# Patient Record
Sex: MALE | Race: WHITE | NOT HISPANIC OR LATINO
[De-identification: names, ages, dates, MRNs, and addresses within clinical notes are randomized per-mention and may not be internally consistent; named-entity substitution may affect disease eponyms.]

---

## 2017-03-04 ENCOUNTER — APPOINTMENT (OUTPATIENT)
Dept: ULTRASOUND IMAGING | Facility: CLINIC | Age: 82
End: 2017-03-04

## 2017-03-04 ENCOUNTER — OUTPATIENT (OUTPATIENT)
Dept: OUTPATIENT SERVICES | Facility: HOSPITAL | Age: 82
LOS: 1 days | End: 2017-03-04
Payer: MEDICARE

## 2017-03-04 DIAGNOSIS — Z00.8 ENCOUNTER FOR OTHER GENERAL EXAMINATION: ICD-10-CM

## 2017-03-04 PROCEDURE — 76770 US EXAM ABDO BACK WALL COMP: CPT

## 2017-03-09 ENCOUNTER — APPOINTMENT (OUTPATIENT)
Dept: UROLOGY | Facility: CLINIC | Age: 82
End: 2017-03-09

## 2017-03-09 VITALS
DIASTOLIC BLOOD PRESSURE: 70 MMHG | WEIGHT: 180 LBS | TEMPERATURE: 98.2 F | HEART RATE: 74 BPM | SYSTOLIC BLOOD PRESSURE: 120 MMHG | OXYGEN SATURATION: 97 % | HEIGHT: 67.5 IN | BODY MASS INDEX: 27.92 KG/M2 | RESPIRATION RATE: 16 BRPM

## 2017-03-13 LAB — BACTERIA UR CULT: NORMAL

## 2017-06-12 LAB — BACTERIA UR CULT: NORMAL

## 2017-07-06 ENCOUNTER — APPOINTMENT (OUTPATIENT)
Dept: UROLOGY | Facility: CLINIC | Age: 82
End: 2017-07-06

## 2017-07-06 VITALS
BODY MASS INDEX: 28.25 KG/M2 | SYSTOLIC BLOOD PRESSURE: 130 MMHG | DIASTOLIC BLOOD PRESSURE: 70 MMHG | WEIGHT: 180 LBS | HEIGHT: 67 IN | OXYGEN SATURATION: 97 % | HEART RATE: 79 BPM

## 2017-07-10 LAB — CORE LAB FLUID CYTOLOGY: NORMAL

## 2017-07-13 ENCOUNTER — MEDICATION RENEWAL (OUTPATIENT)
Age: 82
End: 2017-07-13

## 2017-07-31 ENCOUNTER — APPOINTMENT (OUTPATIENT)
Dept: UROLOGY | Facility: CLINIC | Age: 82
End: 2017-07-31
Payer: MEDICARE

## 2017-07-31 PROCEDURE — 99213 OFFICE O/P EST LOW 20 MIN: CPT

## 2017-09-14 ENCOUNTER — APPOINTMENT (OUTPATIENT)
Dept: UROLOGY | Facility: CLINIC | Age: 82
End: 2017-09-14
Payer: MEDICARE

## 2017-09-14 VITALS — SYSTOLIC BLOOD PRESSURE: 120 MMHG | DIASTOLIC BLOOD PRESSURE: 60 MMHG

## 2017-09-14 PROCEDURE — 99213 OFFICE O/P EST LOW 20 MIN: CPT

## 2017-09-14 RX ORDER — DUTASTERIDE 0.5 MG/1
0.5 CAPSULE, LIQUID FILLED ORAL
Qty: 30 | Refills: 6 | Status: DISCONTINUED | COMMUNITY
Start: 2017-07-06 | End: 2017-09-14

## 2017-09-17 LAB
APPEARANCE: ABNORMAL
BACTERIA UR CULT: NORMAL
BACTERIA: NEGATIVE
BILIRUBIN URINE: NEGATIVE
BLOOD URINE: ABNORMAL
COLOR: ABNORMAL
GLUCOSE QUALITATIVE U: NORMAL MG/DL
HYALINE CASTS: 1 /LPF
KETONES URINE: NEGATIVE
LEUKOCYTE ESTERASE URINE: ABNORMAL
MICROSCOPIC-UA: NORMAL
NITRITE URINE: NEGATIVE
PH URINE: 5
PROTEIN URINE: 100 MG/DL
RED BLOOD CELLS URINE: 276 /HPF
SPECIFIC GRAVITY URINE: 1.02
SQUAMOUS EPITHELIAL CELLS: 0 /HPF
UROBILINOGEN URINE: NORMAL MG/DL
WHITE BLOOD CELLS URINE: 6 /HPF

## 2018-01-11 ENCOUNTER — APPOINTMENT (OUTPATIENT)
Dept: UROLOGY | Facility: CLINIC | Age: 83
End: 2018-01-11
Payer: MEDICARE

## 2018-01-11 DIAGNOSIS — Z87.448 PERSONAL HISTORY OF OTHER DISEASES OF URINARY SYSTEM: ICD-10-CM

## 2018-01-11 PROCEDURE — 99213 OFFICE O/P EST LOW 20 MIN: CPT

## 2018-02-12 ENCOUNTER — MEDICATION RENEWAL (OUTPATIENT)
Age: 83
End: 2018-02-12

## 2018-07-31 ENCOUNTER — RX RENEWAL (OUTPATIENT)
Age: 83
End: 2018-07-31

## 2018-08-09 ENCOUNTER — APPOINTMENT (OUTPATIENT)
Dept: UROLOGY | Facility: CLINIC | Age: 83
End: 2018-08-09
Payer: MEDICARE

## 2018-08-09 VITALS — HEART RATE: 69 BPM | SYSTOLIC BLOOD PRESSURE: 120 MMHG | OXYGEN SATURATION: 96 % | DIASTOLIC BLOOD PRESSURE: 70 MMHG

## 2018-08-09 PROCEDURE — 99213 OFFICE O/P EST LOW 20 MIN: CPT

## 2018-11-12 ENCOUNTER — OUTPATIENT (OUTPATIENT)
Dept: OUTPATIENT SERVICES | Facility: HOSPITAL | Age: 83
LOS: 1 days | End: 2018-11-12
Payer: MEDICARE

## 2018-11-12 ENCOUNTER — APPOINTMENT (OUTPATIENT)
Dept: ULTRASOUND IMAGING | Facility: CLINIC | Age: 83
End: 2018-11-12
Payer: MEDICARE

## 2018-11-12 DIAGNOSIS — Z00.8 ENCOUNTER FOR OTHER GENERAL EXAMINATION: ICD-10-CM

## 2018-11-12 PROCEDURE — 76536 US EXAM OF HEAD AND NECK: CPT

## 2018-11-12 PROCEDURE — 76536 US EXAM OF HEAD AND NECK: CPT | Mod: 26

## 2018-11-14 ENCOUNTER — RESULT REVIEW (OUTPATIENT)
Age: 83
End: 2018-11-14

## 2019-01-28 ENCOUNTER — MEDICATION RENEWAL (OUTPATIENT)
Age: 84
End: 2019-01-28

## 2019-06-28 ENCOUNTER — APPOINTMENT (OUTPATIENT)
Dept: UROLOGY | Facility: CLINIC | Age: 84
End: 2019-06-28
Payer: MEDICARE

## 2019-06-28 VITALS
WEIGHT: 172 LBS | HEART RATE: 80 BPM | HEIGHT: 67 IN | BODY MASS INDEX: 27 KG/M2 | SYSTOLIC BLOOD PRESSURE: 128 MMHG | DIASTOLIC BLOOD PRESSURE: 73 MMHG | RESPIRATION RATE: 16 BRPM

## 2019-06-28 PROCEDURE — 99213 OFFICE O/P EST LOW 20 MIN: CPT

## 2019-06-28 NOTE — ASSESSMENT
[FreeTextEntry1] : He seems to be voiding relatively well with minimal nocturia. Residual urine volume is acceptable. He will continue with the same medication therapy. He can follow up in one year. He will continue to watch the kidney stone.\par \par Thang Gabriel MD, FACS\par [facility]\par  of Urology\par \par [address], [address]\[place] [address]\par \par [address], [address]\[place] [address]\par \par Tel: [phone]\par Fax: [phone]

## 2019-06-28 NOTE — LETTER BODY
[Dear  ___] : Dear  [unfilled], [Consult Letter:] : I had the pleasure of evaluating your patient, [unfilled]. [Consult Closing:] : Thank you very much for allowing me to participate in the care of this patient.  If you have any questions, please do not hesitate to contact me. [FreeTextEntry1] : Address:  [address], [place] [address]\par Phone: [phone]

## 2019-06-28 NOTE — HISTORY OF PRESENT ILLNESS
[FreeTextEntry1] : He is an 88-year-old man who is seen today in follow-up for urinary symptoms, kidney stones and hematuria. He has to double void to empty his bladder. Residual urine today was 120 cc. He is on finasteride and tamsulosin. There has not been any recent hematuria. There is no flank pain. He is decided not to treat kidney stone for now. \par Previous notes: previously, cystoscopy showed bleeding from prostate and also small sand-like stones in the bladder. CT showed a 1.7 cm left kidney stone a 3 cm AAA. Urine culture and cytology were negative. He underwent shockwave lithotripsy about 30 years ago for kidney stone.

## 2019-06-28 NOTE — PHYSICAL EXAM
[General Appearance - Well Developed] : well developed [General Appearance - Well Nourished] : well nourished [Normal Appearance] : normal appearance [Well Groomed] : well groomed [General Appearance - In No Acute Distress] : no acute distress [Abdomen Soft] : soft [Abdomen Tenderness] : non-tender [Costovertebral Angle Tenderness] : no ~M costovertebral angle tenderness [FreeTextEntry1] : The bladder not palpable [] : no respiratory distress [Respiration, Rhythm And Depth] : normal respiratory rhythm and effort [Exaggerated Use Of Accessory Muscles For Inspiration] : no accessory muscle use [Oriented To Time, Place, And Person] : oriented to person, place, and time [Affect] : the affect was normal [Mood] : the mood was normal [Not Anxious] : not anxious

## 2019-08-08 ENCOUNTER — APPOINTMENT (OUTPATIENT)
Dept: UROLOGY | Facility: CLINIC | Age: 84
End: 2019-08-08

## 2020-08-14 ENCOUNTER — APPOINTMENT (OUTPATIENT)
Dept: UROLOGY | Facility: CLINIC | Age: 85
End: 2020-08-14
Payer: MEDICARE

## 2020-08-14 VITALS
TEMPERATURE: 98.7 F | HEART RATE: 79 BPM | DIASTOLIC BLOOD PRESSURE: 76 MMHG | WEIGHT: 174 LBS | RESPIRATION RATE: 16 BRPM | HEIGHT: 67 IN | BODY MASS INDEX: 27.31 KG/M2 | SYSTOLIC BLOOD PRESSURE: 154 MMHG

## 2020-08-14 PROCEDURE — 99213 OFFICE O/P EST LOW 20 MIN: CPT

## 2020-08-14 NOTE — HISTORY OF PRESENT ILLNESS
[FreeTextEntry1] : He is an 89 year-old man who is seen today in follow-up for urinary symptoms, kidney stones and hematuria. Nocturia is only one time. Urinary flow is normal. Residual urine today was 80 cc. He is on Flomax and Proscar. He does not have any kidney pain or recent gross hematuria. So far he has continued with observation of large kidney stone.\par Previous notes: previously, cystoscopy showed bleeding from prostate and also small sand-like stones in the bladder. CT showed a 1.7 cm left kidney stone and 3 cm AAA. Urine culture and cytology were negative. He underwent shockwave lithotripsy about 30 years ago for kidney stone.

## 2020-08-14 NOTE — LETTER BODY
[Dear  ___] : Dear  [unfilled], [Consult Letter:] : I had the pleasure of evaluating your patient, [unfilled]. [Consult Closing:] : Thank you very much for allowing me to participate in the care of this patient.  If you have any questions, please do not hesitate to contact me. [FreeTextEntry1] : Address:  [address], [place] [address]\par Phone: [phone]

## 2020-08-14 NOTE — ASSESSMENT
[FreeTextEntry1] : His examination is unremarkable. He is doing very well from urinary standpoint. He will continue with the above medication therapy. Residual urine is acceptable. He is not bothered by large kidney stone and has not had recent hematuria. Followup in one year. Another copy of CT scan from 2017 was given to him for his primary care physician.\par \par Thang Gabriel MD, FACS\par [facility]\par  of Urology\par \par [address], [address]\[place] [address]\par \par [address], [address]\[place] [address]\par \par Tel: [phone]\par Fax: [phone]

## 2020-08-14 NOTE — PHYSICAL EXAM
[General Appearance - Well Developed] : well developed [General Appearance - Well Nourished] : well nourished [Normal Appearance] : normal appearance [Well Groomed] : well groomed [General Appearance - In No Acute Distress] : no acute distress [Abdomen Tenderness] : non-tender [Abdomen Soft] : soft [FreeTextEntry1] : The bladder not distended.  [Costovertebral Angle Tenderness] : no ~M costovertebral angle tenderness [] : no respiratory distress [Respiration, Rhythm And Depth] : normal respiratory rhythm and effort [Exaggerated Use Of Accessory Muscles For Inspiration] : no accessory muscle use [Oriented To Time, Place, And Person] : oriented to person, place, and time [Affect] : the affect was normal [Not Anxious] : not anxious [Mood] : the mood was normal

## 2020-08-21 ENCOUNTER — INPATIENT (INPATIENT)
Facility: HOSPITAL | Age: 85
LOS: 4 days | Discharge: HOME | DRG: 280 | End: 2020-08-26
Attending: INTERNAL MEDICINE | Admitting: INTERNAL MEDICINE
Payer: MEDICARE

## 2020-08-21 VITALS
RESPIRATION RATE: 28 BRPM | HEART RATE: 106 BPM | OXYGEN SATURATION: 98 % | DIASTOLIC BLOOD PRESSURE: 61 MMHG | SYSTOLIC BLOOD PRESSURE: 143 MMHG

## 2020-08-21 DIAGNOSIS — I21.3 ST ELEVATION (STEMI) MYOCARDIAL INFARCTION OF UNSPECIFIED SITE: ICD-10-CM

## 2020-08-21 LAB
ALBUMIN SERPL ELPH-MCNC: 4 G/DL — SIGNIFICANT CHANGE UP (ref 3.3–5.2)
ALP SERPL-CCNC: 86 U/L — SIGNIFICANT CHANGE UP (ref 40–120)
ALT FLD-CCNC: 32 U/L — SIGNIFICANT CHANGE UP
ANION GAP SERPL CALC-SCNC: 19 MMOL/L — HIGH (ref 5–17)
APTT BLD: 29.3 SEC — SIGNIFICANT CHANGE UP (ref 27.5–35.5)
AST SERPL-CCNC: 76 U/L — HIGH
BASOPHILS # BLD AUTO: 0 K/UL — SIGNIFICANT CHANGE UP (ref 0–0.2)
BASOPHILS NFR BLD AUTO: 0 % — SIGNIFICANT CHANGE UP (ref 0–2)
BILIRUB SERPL-MCNC: 0.9 MG/DL — SIGNIFICANT CHANGE UP (ref 0.4–2)
BLD GP AB SCN SERPL QL: SIGNIFICANT CHANGE UP
BUN SERPL-MCNC: 12 MG/DL — SIGNIFICANT CHANGE UP (ref 8–20)
CALCIUM SERPL-MCNC: 8.5 MG/DL — LOW (ref 8.6–10.2)
CHLORIDE SERPL-SCNC: 96 MMOL/L — LOW (ref 98–107)
CO2 SERPL-SCNC: 19 MMOL/L — LOW (ref 22–29)
CREAT SERPL-MCNC: 0.92 MG/DL — SIGNIFICANT CHANGE UP (ref 0.5–1.3)
EOSINOPHIL # BLD AUTO: 0 K/UL — SIGNIFICANT CHANGE UP (ref 0–0.5)
EOSINOPHIL NFR BLD AUTO: 0 % — SIGNIFICANT CHANGE UP (ref 0–6)
GAS PNL BLDA: SIGNIFICANT CHANGE UP
GLUCOSE SERPL-MCNC: 253 MG/DL — HIGH (ref 70–99)
HCT VFR BLD CALC: 43.8 % — SIGNIFICANT CHANGE UP (ref 39–50)
HGB BLD-MCNC: 14.2 G/DL — SIGNIFICANT CHANGE UP (ref 13–17)
INR BLD: 1.07 RATIO — SIGNIFICANT CHANGE UP (ref 0.88–1.16)
LYMPHOCYTES # BLD AUTO: 1.47 K/UL — SIGNIFICANT CHANGE UP (ref 1–3.3)
LYMPHOCYTES # BLD AUTO: 5.9 % — LOW (ref 13–44)
MAGNESIUM SERPL-MCNC: 1.7 MG/DL — SIGNIFICANT CHANGE UP (ref 1.6–2.6)
MANUAL SMEAR VERIFICATION: SIGNIFICANT CHANGE UP
MCHC RBC-ENTMCNC: 31.8 PG — SIGNIFICANT CHANGE UP (ref 27–34)
MCHC RBC-ENTMCNC: 32.4 GM/DL — SIGNIFICANT CHANGE UP (ref 32–36)
MCV RBC AUTO: 98 FL — SIGNIFICANT CHANGE UP (ref 80–100)
MONOCYTES # BLD AUTO: 1.47 K/UL — HIGH (ref 0–0.9)
MONOCYTES NFR BLD AUTO: 5.9 % — SIGNIFICANT CHANGE UP (ref 2–14)
NEUTROPHILS # BLD AUTO: 21.93 K/UL — HIGH (ref 1.8–7.4)
NEUTROPHILS NFR BLD AUTO: 87.3 % — HIGH (ref 43–77)
NEUTS BAND # BLD: 0.9 % — SIGNIFICANT CHANGE UP (ref 0–8)
NT-PROBNP SERPL-SCNC: 2525 PG/ML — HIGH (ref 0–300)
PHOSPHATE SERPL-MCNC: 3.5 MG/DL — SIGNIFICANT CHANGE UP (ref 2.4–4.7)
PLAT MORPH BLD: NORMAL — SIGNIFICANT CHANGE UP
PLATELET # BLD AUTO: 224 K/UL — SIGNIFICANT CHANGE UP (ref 150–400)
POTASSIUM SERPL-MCNC: 4.1 MMOL/L — SIGNIFICANT CHANGE UP (ref 3.5–5.3)
POTASSIUM SERPL-SCNC: 4.1 MMOL/L — SIGNIFICANT CHANGE UP (ref 3.5–5.3)
PROT SERPL-MCNC: 7.1 G/DL — SIGNIFICANT CHANGE UP (ref 6.6–8.7)
PROTHROM AB SERPL-ACNC: 12.4 SEC — SIGNIFICANT CHANGE UP (ref 10.6–13.6)
RBC # BLD: 4.47 M/UL — SIGNIFICANT CHANGE UP (ref 4.2–5.8)
RBC # FLD: 13.2 % — SIGNIFICANT CHANGE UP (ref 10.3–14.5)
RBC BLD AUTO: NORMAL — SIGNIFICANT CHANGE UP
SODIUM SERPL-SCNC: 134 MMOL/L — LOW (ref 135–145)
TROPONIN T SERPL-MCNC: 0.82 NG/ML — HIGH (ref 0–0.06)
WBC # BLD: 24.86 K/UL — HIGH (ref 3.8–10.5)
WBC # FLD AUTO: 24.86 K/UL — HIGH (ref 3.8–10.5)

## 2020-08-21 PROCEDURE — 93458 L HRT ARTERY/VENTRICLE ANGIO: CPT | Mod: 26

## 2020-08-21 PROCEDURE — 93010 ELECTROCARDIOGRAM REPORT: CPT

## 2020-08-21 PROCEDURE — 99223 1ST HOSP IP/OBS HIGH 75: CPT | Mod: 25

## 2020-08-21 PROCEDURE — 71045 X-RAY EXAM CHEST 1 VIEW: CPT | Mod: 26

## 2020-08-21 RX ORDER — CLOPIDOGREL BISULFATE 75 MG/1
75 TABLET, FILM COATED ORAL DAILY
Refills: 0 | Status: DISCONTINUED | OUTPATIENT
Start: 2020-08-22 | End: 2020-08-26

## 2020-08-21 RX ORDER — FINASTERIDE 5 MG/1
1 TABLET, FILM COATED ORAL
Qty: 0 | Refills: 0 | DISCHARGE

## 2020-08-21 RX ORDER — PROPOFOL 10 MG/ML
10 INJECTION, EMULSION INTRAVENOUS
Qty: 1000 | Refills: 0 | Status: DISCONTINUED | OUTPATIENT
Start: 2020-08-21 | End: 2020-08-21

## 2020-08-21 RX ORDER — HEPARIN SODIUM 5000 [USP'U]/ML
4000 INJECTION INTRAVENOUS; SUBCUTANEOUS EVERY 6 HOURS
Refills: 0 | Status: DISCONTINUED | OUTPATIENT
Start: 2020-08-21 | End: 2020-08-24

## 2020-08-21 RX ORDER — CHLORHEXIDINE GLUCONATE 213 G/1000ML
1 SOLUTION TOPICAL
Refills: 0 | Status: DISCONTINUED | OUTPATIENT
Start: 2020-08-21 | End: 2020-08-26

## 2020-08-21 RX ORDER — AMIODARONE HYDROCHLORIDE 400 MG/1
0.5 TABLET ORAL
Qty: 900 | Refills: 0 | Status: DISCONTINUED | OUTPATIENT
Start: 2020-08-21 | End: 2020-08-22

## 2020-08-21 RX ORDER — HEPARIN SODIUM 5000 [USP'U]/ML
INJECTION INTRAVENOUS; SUBCUTANEOUS
Qty: 25000 | Refills: 0 | Status: DISCONTINUED | OUTPATIENT
Start: 2020-08-21 | End: 2020-08-24

## 2020-08-21 RX ORDER — ASPIRIN/CALCIUM CARB/MAGNESIUM 324 MG
81 TABLET ORAL DAILY
Refills: 0 | Status: DISCONTINUED | OUTPATIENT
Start: 2020-08-21 | End: 2020-08-26

## 2020-08-21 RX ORDER — DEXMEDETOMIDINE HYDROCHLORIDE IN 0.9% SODIUM CHLORIDE 4 UG/ML
0.5 INJECTION INTRAVENOUS
Qty: 200 | Refills: 0 | Status: DISCONTINUED | OUTPATIENT
Start: 2020-08-21 | End: 2020-08-22

## 2020-08-21 RX ORDER — ASPIRIN/CALCIUM CARB/MAGNESIUM 324 MG
1 TABLET ORAL
Qty: 0 | Refills: 0 | DISCHARGE

## 2020-08-21 RX ORDER — CLOPIDOGREL BISULFATE 75 MG/1
600 TABLET, FILM COATED ORAL ONCE
Refills: 0 | Status: COMPLETED | OUTPATIENT
Start: 2020-08-21 | End: 2020-08-21

## 2020-08-21 RX ORDER — CHLORHEXIDINE GLUCONATE 213 G/1000ML
15 SOLUTION TOPICAL EVERY 12 HOURS
Refills: 0 | Status: DISCONTINUED | OUTPATIENT
Start: 2020-08-21 | End: 2020-08-21

## 2020-08-21 RX ORDER — OMEPRAZOLE 10 MG/1
1 CAPSULE, DELAYED RELEASE ORAL
Qty: 0 | Refills: 0 | DISCHARGE

## 2020-08-21 RX ORDER — PANTOPRAZOLE SODIUM 20 MG/1
40 TABLET, DELAYED RELEASE ORAL DAILY
Refills: 0 | Status: DISCONTINUED | OUTPATIENT
Start: 2020-08-21 | End: 2020-08-22

## 2020-08-21 RX ORDER — ATORVASTATIN CALCIUM 80 MG/1
80 TABLET, FILM COATED ORAL AT BEDTIME
Refills: 0 | Status: DISCONTINUED | OUTPATIENT
Start: 2020-08-21 | End: 2020-08-26

## 2020-08-21 RX ADMIN — PROPOFOL 5.39 MICROGRAM(S)/KG/MIN: 10 INJECTION, EMULSION INTRAVENOUS at 21:06

## 2020-08-21 RX ADMIN — Medication 81 MILLIGRAM(S): at 22:29

## 2020-08-21 RX ADMIN — ATORVASTATIN CALCIUM 80 MILLIGRAM(S): 80 TABLET, FILM COATED ORAL at 22:29

## 2020-08-21 RX ADMIN — CLOPIDOGREL BISULFATE 600 MILLIGRAM(S): 75 TABLET, FILM COATED ORAL at 22:29

## 2020-08-21 RX ADMIN — PANTOPRAZOLE SODIUM 40 MILLIGRAM(S): 20 TABLET, DELAYED RELEASE ORAL at 22:32

## 2020-08-21 RX ADMIN — AMIODARONE HYDROCHLORIDE 16.7 MG/MIN: 400 TABLET ORAL at 22:50

## 2020-08-21 RX ADMIN — HEPARIN SODIUM 1000 UNIT(S)/HR: 5000 INJECTION INTRAVENOUS; SUBCUTANEOUS at 20:52

## 2020-08-21 NOTE — H&P ADULT - NSICDXPASTMEDICALHX_GEN_ALL_CORE_FT
PAST MEDICAL HISTORY:  BPH (benign prostatic hyperplasia)     HLD (hyperlipidemia)     HTN (hypertension)     Nephrolithiasis

## 2020-08-21 NOTE — H&P ADULT - HISTORY OF PRESENT ILLNESS
Mr Plascencia received from [facility] via [facility] as a STEMI. Patient receiced intubated and unresponsive.  No family members were present.    As per outpatient urology note patient has a PMHx of HTN, Nephrolithiasis, BPH and Hematuria.     As per chart from [facility] ED dept., patient presented with x/o CP and was being admitted for NSTEMI. A heparin drip was initiated.  Before being transferred out of the ED patient went into Vfib requiring resuscitation and intubation.  Repeat EKG revealed changes in V1 through V4. STEMI called and patient was transferred to [facility]. A Cardiac Catheterization was done and patient was noted to have an occluded distal LAD, thought to be the culprit vessel.  No intervention was done at this time.
Neck pain

## 2020-08-21 NOTE — CONSULT NOTE ADULT - SUBJECTIVE AND OBJECTIVE BOX
89 yo m pmhx HTN, HLD, BPH, nephrolithiasis presented to [facility] with chest pain, was being admitted for heparin gtt and ACS work up when he sustained vfib cardiac arrest prompting transfer to [facility].  Patient had witnessed arrest, vfib received ACLS, shocked, received amio then ROSC achieved.  Patient transferred to [facility] for emergent intervention, in cath lab patient found to have severe mid to distal calcified LAD disease with distal thrombus,   60-70% prox circ stenosis and 80-90% RCA stenosis, likely LAD infarct, EF 30-35%, no intervention at this time and was transferred to MICU.        PAST MEDICAL & SURGICAL HISTORY:  HTN    Allergies  Allergy Status Unknown      FAMILY HISTORY:  Unknown       Social History:   From home lives with son.       Review of Systems:  Unable to obtain 2/2       Vitals During Exam:   HR:   BP:   RR:  sPO2:     Physical Examination:    General: No acute distress.      HEENT: Pupils equal, reactive to light.  Symmetric.    PULM: Clear to auscultation bilaterally, no significant sputum production    CVS: Regular rate and rhythm, no murmurs, rubs, or gallops    ABD: Soft, nondistended, nontender, normoactive bowel sounds, no masses    EXT: No edema, nontender    SKIN: Warm and well perfused, no rashes noted.    NEURO: Alert, oriented, interactive, nonfocal      Medications:  chlorhexidine 4% Liquid 1 Application(s) Topical <User Schedule>      ICU Vital Signs Last 24 Hrs  T(C): --  T(F): --  HR: --  BP: --  BP(mean): --  ABP: --  ABP(mean): --  RR: --  SpO2: --    Vital Signs Last 24 Hrs  T(C): --  T(F): --  HR: --  BP: --  BP(mean): --  RR: --  SpO2: --        LABS:  PENDING      RADIOLOGY:   Pending      SUPPLEMENTAL O2: AC/VC  LINES: Peripheral   IVF: N  BECKFORD: N  PPx: PPI,   CONTACT: N 88 yo m pmhx HTN, HLD, BPH, nephrolithiasis presented to [facility] with chest pain, was being admitted for heparin gtt and ACS work up when he sustained vfib cardiac arrest prompting transfer to [facility].  Patient had witnessed arrest, vfib received ACLS, shocked, received amio then ROSC achieved.  Patient transferred to [facility] for emergent intervention, in cath lab patient found to have severe mid to distal calcified LAD disease with distal thrombus,   60-70% prox circ stenosis and 80-90% RCA stenosis, likely LAD infarct, EF 30-35%, no intervention at this time and was transferred to MICU.        PAST MEDICAL & SURGICAL HISTORY:  HTN    Allergies  Allergy Status Unknown      FAMILY HISTORY:  Unknown       Social History:   From home lives with son.       Review of Systems:  Unable to obtain 2/2       Physical Examination:    General: Elderly male, lying in bed, NAD    HEENT: NC/AT, Pupils 3mm, equal, reactive to light.  Symmetric. ETT and OG tube in place.     PULM: Symmetrical thorax expansion upon respiration.  Clear to auscultation bilaterally, no significant sputum production appreciated    CVS: Regular rate and rhythm, no murmurs, rubs, or gallops appreciated    ABD: Soft, nondistended, nontender, normoactive bowel sounds, no masses appreciated    EXT: No edema, nontender    SKIN: Warm and well perfused, no rashes noted.    NEURO: Intubated, moving around, nonpurposeful movement.       Medications:  chlorhexidine 4% Liquid 1 Application(s) Topical <User Schedule>      ICU Vital Signs Last 24 Hrs  T(C): --  T(F): --  HR: --  BP: --  BP(mean): --  ABP: --  ABP(mean): --  RR: --  SpO2: --    Vital Signs Last 24 Hrs  T(C): --  T(F): --  HR: --  BP: --  BP(mean): --  RR: --  SpO2: --        LABS:  PENDING      RADIOLOGY:   Pending      SUPPLEMENTAL O2: AC/VC  LINES: Peripheral   IVF: N  BECKFORD: N  PPx: PPI,   CONTACT: N

## 2020-08-21 NOTE — PROGRESS NOTE ADULT - SUBJECTIVE AND OBJECTIVE BOX
NSTEMI at [facility].     Full report to follow.     LM Distal 20-30%.   LAD- mid 70-80%, distal 100%.   circumflex- proximal 70%,   RCA- proximal 80%/     LV function- severely depressed.     Based on anatomy and other factors, plan for medical management.     Aspirin, plavix, statin. Hold beta blockers tonight.     based on neurologic recovery, consider PCI to circumflex and RCA on monday.     Called home phone number- no answer.

## 2020-08-21 NOTE — H&P ADULT - ASSESSMENT
89 yo male sp Vfib arrest, resuscitation, intubation and cardiac catheterization. Total occlusion of the distal LAD.    Plan:  Admit to MICU  Medical management  ASA 81 mg po daily  Plavix 600 mg po now   Plavix 75mg po daily  Lipitor 80 mg po daily  Troponin stat and Q 8 hours x 2  EKGs stat and Q 8 x 2  No beta blocker yet will reevaluate in am  Start Heparin gtt at ACS at 2100  ECHO in am

## 2020-08-21 NOTE — CONSULT NOTE ADULT - ASSESSMENT
88 yo m pmhx HTN, HLD, BPH, nephrolithiasis admitted with     1. Cardiac Arrest  2. STEMI  3. Respiratory Failure    NEURO: ?anoxic encephalopathy, patient moving around however no purposeful movement at this time, low dose propofol for sedation.    CV: STEMI with high grade LAD, circ and rca disease.  No intervention at this time pending improvement in mental status.  ASA, Brilinta, statin therapy.  Avoiding BB and afterload reduction meds at this time.    RESP: Hypoxic respiratory Failure ac/vc 6cc/kg tv lung protective strategies, actively titrating fio2 and peep for spo2 >88%, weaning as tolerated.  PPI for stress ulcer ppx, peridex for VAP ppx  RENAL:   GI: NPO   ENDO: POCT q6hr while nPO  ID: No active infectious process  HEME:   DISPO: Full code    Critical Care time: 45 mins assessing presenting problems of acute illness that poses high probability of life threatening deterioration or end organ damage/dysfunction.  Medical decision making including Initiating plan of care, reviewing data, reviewing radiology, discussing with multidisciplinary team, non inclusive of procedures, discussing goals of care with patient/family 89 yo m pmhx HTN, HLD, BPH, nephrolithiasis admitted with     1. Cardiac Arrest  2. STEMI  3. Respiratory Failure    NEURO: ?anoxic encephalopathy, patient moving around however no purposeful movement at this time, low dose propofol for sedation.    CV: STEMI with high grade LAD, circ and rca disease.  No intervention at this time pending improvement in mental status.  ASA, Brilinta, statin therapy.  Avoiding BB and afterload reduction meds at this time.    RESP: Hypoxic respiratory Failure ac/vc 6cc/kg tv lung protective strategies, actively titrating fio2 and peep for spo2 >88%, weaning as tolerated.  PPI for stress ulcer ppx, peridex for VAP ppx  RENAL: Monitor lytes, replace as needed, collazo in place.   GI: NPO   ENDO: POCT q6hr while nPO  ID: No active infectious process  HEME: Heparin gtt for ac.   DISPO: Full code    Critical Care time: 45 mins assessing presenting problems of acute illness that poses high probability of life threatening deterioration or end organ damage/dysfunction.  Medical decision making including Initiating plan of care, reviewing data, reviewing radiology, discussing with multidisciplinary team, non inclusive of procedures, discussing goals of care with patient/family

## 2020-08-22 LAB
ABO RH CONFIRMATION: SIGNIFICANT CHANGE UP
ALBUMIN SERPL ELPH-MCNC: 4 G/DL — SIGNIFICANT CHANGE UP (ref 3.3–5.2)
ALP SERPL-CCNC: 91 U/L — SIGNIFICANT CHANGE UP (ref 40–120)
ALT FLD-CCNC: 34 U/L — SIGNIFICANT CHANGE UP
ANION GAP SERPL CALC-SCNC: 15 MMOL/L — SIGNIFICANT CHANGE UP (ref 5–17)
ANION GAP SERPL CALC-SCNC: 16 MMOL/L — SIGNIFICANT CHANGE UP (ref 5–17)
APTT BLD: 45.1 SEC — HIGH (ref 27.5–35.5)
APTT BLD: 69.6 SEC — HIGH (ref 27.5–35.5)
APTT BLD: 70 SEC — HIGH (ref 27.5–35.5)
AST SERPL-CCNC: 101 U/L — HIGH
BASOPHILS # BLD AUTO: 0.03 K/UL — SIGNIFICANT CHANGE UP (ref 0–0.2)
BASOPHILS NFR BLD AUTO: 0.2 % — SIGNIFICANT CHANGE UP (ref 0–2)
BILIRUB SERPL-MCNC: 0.8 MG/DL — SIGNIFICANT CHANGE UP (ref 0.4–2)
BUN SERPL-MCNC: 12 MG/DL — SIGNIFICANT CHANGE UP (ref 8–20)
BUN SERPL-MCNC: 17 MG/DL — SIGNIFICANT CHANGE UP (ref 8–20)
CALCIUM SERPL-MCNC: 8.8 MG/DL — SIGNIFICANT CHANGE UP (ref 8.6–10.2)
CALCIUM SERPL-MCNC: 9.1 MG/DL — SIGNIFICANT CHANGE UP (ref 8.6–10.2)
CHLORIDE SERPL-SCNC: 100 MMOL/L — SIGNIFICANT CHANGE UP (ref 98–107)
CHLORIDE SERPL-SCNC: 95 MMOL/L — LOW (ref 98–107)
CK MB CFR SERPL CALC: 63 NG/ML — HIGH (ref 0–6.7)
CK SERPL-CCNC: 829 U/L — HIGH (ref 30–200)
CO2 SERPL-SCNC: 21 MMOL/L — LOW (ref 22–29)
CO2 SERPL-SCNC: 23 MMOL/L — SIGNIFICANT CHANGE UP (ref 22–29)
CREAT SERPL-MCNC: 0.89 MG/DL — SIGNIFICANT CHANGE UP (ref 0.5–1.3)
CREAT SERPL-MCNC: 1.23 MG/DL — SIGNIFICANT CHANGE UP (ref 0.5–1.3)
EOSINOPHIL # BLD AUTO: 0 K/UL — SIGNIFICANT CHANGE UP (ref 0–0.5)
EOSINOPHIL NFR BLD AUTO: 0 % — SIGNIFICANT CHANGE UP (ref 0–6)
GLUCOSE BLDC GLUCOMTR-MCNC: 103 MG/DL — HIGH (ref 70–99)
GLUCOSE BLDC GLUCOMTR-MCNC: 129 MG/DL — HIGH (ref 70–99)
GLUCOSE BLDC GLUCOMTR-MCNC: 135 MG/DL — HIGH (ref 70–99)
GLUCOSE BLDC GLUCOMTR-MCNC: 142 MG/DL — HIGH (ref 70–99)
GLUCOSE BLDC GLUCOMTR-MCNC: 150 MG/DL — HIGH (ref 70–99)
GLUCOSE SERPL-MCNC: 146 MG/DL — HIGH (ref 70–99)
GLUCOSE SERPL-MCNC: 87 MG/DL — SIGNIFICANT CHANGE UP (ref 70–99)
HCT VFR BLD CALC: 42 % — SIGNIFICANT CHANGE UP (ref 39–50)
HCT VFR BLD CALC: 44.1 % — SIGNIFICANT CHANGE UP (ref 39–50)
HGB BLD-MCNC: 13.7 G/DL — SIGNIFICANT CHANGE UP (ref 13–17)
HGB BLD-MCNC: 14.3 G/DL — SIGNIFICANT CHANGE UP (ref 13–17)
IMM GRANULOCYTES NFR BLD AUTO: 0.6 % — SIGNIFICANT CHANGE UP (ref 0–1.5)
INR BLD: 1.05 RATIO — SIGNIFICANT CHANGE UP (ref 0.88–1.16)
LACTATE SERPL-SCNC: 3.5 MMOL/L — HIGH (ref 0.5–2)
LYMPHOCYTES # BLD AUTO: 0.94 K/UL — LOW (ref 1–3.3)
LYMPHOCYTES # BLD AUTO: 4.8 % — LOW (ref 13–44)
MAGNESIUM SERPL-MCNC: 1.8 MG/DL — SIGNIFICANT CHANGE UP (ref 1.6–2.6)
MAGNESIUM SERPL-MCNC: 2.1 MG/DL — SIGNIFICANT CHANGE UP (ref 1.6–2.6)
MCHC RBC-ENTMCNC: 31.2 PG — SIGNIFICANT CHANGE UP (ref 27–34)
MCHC RBC-ENTMCNC: 31.2 PG — SIGNIFICANT CHANGE UP (ref 27–34)
MCHC RBC-ENTMCNC: 32.4 GM/DL — SIGNIFICANT CHANGE UP (ref 32–36)
MCHC RBC-ENTMCNC: 32.6 GM/DL — SIGNIFICANT CHANGE UP (ref 32–36)
MCV RBC AUTO: 95.7 FL — SIGNIFICANT CHANGE UP (ref 80–100)
MCV RBC AUTO: 96.1 FL — SIGNIFICANT CHANGE UP (ref 80–100)
MONOCYTES # BLD AUTO: 0.97 K/UL — HIGH (ref 0–0.9)
MONOCYTES NFR BLD AUTO: 4.9 % — SIGNIFICANT CHANGE UP (ref 2–14)
NEUTROPHILS # BLD AUTO: 17.55 K/UL — HIGH (ref 1.8–7.4)
NEUTROPHILS NFR BLD AUTO: 89.5 % — HIGH (ref 43–77)
PHOSPHATE SERPL-MCNC: 2.2 MG/DL — LOW (ref 2.4–4.7)
PLATELET # BLD AUTO: 190 K/UL — SIGNIFICANT CHANGE UP (ref 150–400)
PLATELET # BLD AUTO: 223 K/UL — SIGNIFICANT CHANGE UP (ref 150–400)
POTASSIUM SERPL-MCNC: 4.1 MMOL/L — SIGNIFICANT CHANGE UP (ref 3.5–5.3)
POTASSIUM SERPL-MCNC: 4.5 MMOL/L — SIGNIFICANT CHANGE UP (ref 3.5–5.3)
POTASSIUM SERPL-SCNC: 4.1 MMOL/L — SIGNIFICANT CHANGE UP (ref 3.5–5.3)
POTASSIUM SERPL-SCNC: 4.5 MMOL/L — SIGNIFICANT CHANGE UP (ref 3.5–5.3)
PROT SERPL-MCNC: 7.5 G/DL — SIGNIFICANT CHANGE UP (ref 6.6–8.7)
PROTHROM AB SERPL-ACNC: 12.2 SEC — SIGNIFICANT CHANGE UP (ref 10.6–13.6)
RBC # BLD: 4.39 M/UL — SIGNIFICANT CHANGE UP (ref 4.2–5.8)
RBC # BLD: 4.59 M/UL — SIGNIFICANT CHANGE UP (ref 4.2–5.8)
RBC # FLD: 13.4 % — SIGNIFICANT CHANGE UP (ref 10.3–14.5)
RBC # FLD: 13.5 % — SIGNIFICANT CHANGE UP (ref 10.3–14.5)
SARS-COV-2 IGG SERPL QL IA: NEGATIVE — SIGNIFICANT CHANGE UP
SARS-COV-2 IGM SERPL IA-ACNC: 0.09 INDEX — SIGNIFICANT CHANGE UP
SODIUM SERPL-SCNC: 133 MMOL/L — LOW (ref 135–145)
SODIUM SERPL-SCNC: 137 MMOL/L — SIGNIFICANT CHANGE UP (ref 135–145)
TROPONIN T SERPL-MCNC: 1.57 NG/ML — HIGH (ref 0–0.06)
TROPONIN T SERPL-MCNC: 2.21 NG/ML — HIGH (ref 0–0.06)
WBC # BLD: 16.61 K/UL — HIGH (ref 3.8–10.5)
WBC # BLD: 19.61 K/UL — HIGH (ref 3.8–10.5)
WBC # FLD AUTO: 16.61 K/UL — HIGH (ref 3.8–10.5)
WBC # FLD AUTO: 19.61 K/UL — HIGH (ref 3.8–10.5)

## 2020-08-22 PROCEDURE — 93010 ELECTROCARDIOGRAM REPORT: CPT

## 2020-08-22 PROCEDURE — 93306 TTE W/DOPPLER COMPLETE: CPT | Mod: 26

## 2020-08-22 PROCEDURE — 99233 SBSQ HOSP IP/OBS HIGH 50: CPT

## 2020-08-22 RX ORDER — INSULIN LISPRO 100/ML
VIAL (ML) SUBCUTANEOUS
Refills: 0 | Status: DISCONTINUED | OUTPATIENT
Start: 2020-08-22 | End: 2020-08-26

## 2020-08-22 RX ORDER — DEXTROSE 50 % IN WATER 50 %
12.5 SYRINGE (ML) INTRAVENOUS ONCE
Refills: 0 | Status: DISCONTINUED | OUTPATIENT
Start: 2020-08-22 | End: 2020-08-22

## 2020-08-22 RX ORDER — MAGNESIUM SULFATE 500 MG/ML
1 VIAL (ML) INJECTION ONCE
Refills: 0 | Status: COMPLETED | OUTPATIENT
Start: 2020-08-22 | End: 2020-08-22

## 2020-08-22 RX ORDER — ACETAMINOPHEN 500 MG
975 TABLET ORAL EVERY 8 HOURS
Refills: 0 | Status: COMPLETED | OUTPATIENT
Start: 2020-08-22 | End: 2020-08-25

## 2020-08-22 RX ORDER — ACETAMINOPHEN 500 MG
650 TABLET ORAL EVERY 6 HOURS
Refills: 0 | Status: DISCONTINUED | OUTPATIENT
Start: 2020-08-22 | End: 2020-08-22

## 2020-08-22 RX ORDER — METOPROLOL TARTRATE 50 MG
25 TABLET ORAL
Refills: 0 | Status: DISCONTINUED | OUTPATIENT
Start: 2020-08-22 | End: 2020-08-23

## 2020-08-22 RX ORDER — METOPROLOL TARTRATE 50 MG
12.5 TABLET ORAL
Refills: 0 | Status: DISCONTINUED | OUTPATIENT
Start: 2020-08-22 | End: 2020-08-22

## 2020-08-22 RX ORDER — SODIUM CHLORIDE 9 MG/ML
1000 INJECTION, SOLUTION INTRAVENOUS
Refills: 0 | Status: DISCONTINUED | OUTPATIENT
Start: 2020-08-22 | End: 2020-08-22

## 2020-08-22 RX ORDER — POTASSIUM PHOSPHATE, MONOBASIC POTASSIUM PHOSPHATE, DIBASIC 236; 224 MG/ML; MG/ML
15 INJECTION, SOLUTION INTRAVENOUS ONCE
Refills: 0 | Status: COMPLETED | OUTPATIENT
Start: 2020-08-22 | End: 2020-08-22

## 2020-08-22 RX ORDER — GLUCAGON INJECTION, SOLUTION 0.5 MG/.1ML
1 INJECTION, SOLUTION SUBCUTANEOUS ONCE
Refills: 0 | Status: DISCONTINUED | OUTPATIENT
Start: 2020-08-22 | End: 2020-08-22

## 2020-08-22 RX ORDER — DEXTROSE 50 % IN WATER 50 %
25 SYRINGE (ML) INTRAVENOUS ONCE
Refills: 0 | Status: DISCONTINUED | OUTPATIENT
Start: 2020-08-22 | End: 2020-08-22

## 2020-08-22 RX ORDER — HYDROMORPHONE HYDROCHLORIDE 2 MG/ML
0.5 INJECTION INTRAMUSCULAR; INTRAVENOUS; SUBCUTANEOUS
Refills: 0 | Status: DISCONTINUED | OUTPATIENT
Start: 2020-08-22 | End: 2020-08-26

## 2020-08-22 RX ORDER — DEXTROSE 50 % IN WATER 50 %
15 SYRINGE (ML) INTRAVENOUS ONCE
Refills: 0 | Status: DISCONTINUED | OUTPATIENT
Start: 2020-08-22 | End: 2020-08-22

## 2020-08-22 RX ADMIN — Medication 650 MILLIGRAM(S): at 10:31

## 2020-08-22 RX ADMIN — POTASSIUM PHOSPHATE, MONOBASIC POTASSIUM PHOSPHATE, DIBASIC 62.5 MILLIMOLE(S): 236; 224 INJECTION, SOLUTION INTRAVENOUS at 10:35

## 2020-08-22 RX ADMIN — HEPARIN SODIUM 1200 UNIT(S)/HR: 5000 INJECTION INTRAVENOUS; SUBCUTANEOUS at 04:03

## 2020-08-22 RX ADMIN — HYDROMORPHONE HYDROCHLORIDE 0.5 MILLIGRAM(S): 2 INJECTION INTRAMUSCULAR; INTRAVENOUS; SUBCUTANEOUS at 12:45

## 2020-08-22 RX ADMIN — Medication 975 MILLIGRAM(S): at 15:54

## 2020-08-22 RX ADMIN — Medication 25 MILLIGRAM(S): at 10:31

## 2020-08-22 RX ADMIN — Medication 650 MILLIGRAM(S): at 02:00

## 2020-08-22 RX ADMIN — ATORVASTATIN CALCIUM 80 MILLIGRAM(S): 80 TABLET, FILM COATED ORAL at 21:29

## 2020-08-22 RX ADMIN — Medication 975 MILLIGRAM(S): at 21:32

## 2020-08-22 RX ADMIN — Medication 25 MILLIGRAM(S): at 19:08

## 2020-08-22 RX ADMIN — Medication 975 MILLIGRAM(S): at 21:28

## 2020-08-22 RX ADMIN — HEPARIN SODIUM 1200 UNIT(S)/HR: 5000 INJECTION INTRAVENOUS; SUBCUTANEOUS at 10:50

## 2020-08-22 RX ADMIN — CLOPIDOGREL BISULFATE 75 MILLIGRAM(S): 75 TABLET, FILM COATED ORAL at 13:18

## 2020-08-22 RX ADMIN — Medication 650 MILLIGRAM(S): at 02:30

## 2020-08-22 RX ADMIN — Medication 100 GRAM(S): at 10:34

## 2020-08-22 RX ADMIN — Medication 650 MILLIGRAM(S): at 11:53

## 2020-08-22 RX ADMIN — Medication 81 MILLIGRAM(S): at 13:18

## 2020-08-22 RX ADMIN — Medication 975 MILLIGRAM(S): at 14:52

## 2020-08-22 RX ADMIN — HYDROMORPHONE HYDROCHLORIDE 0.5 MILLIGRAM(S): 2 INJECTION INTRAMUSCULAR; INTRAVENOUS; SUBCUTANEOUS at 13:00

## 2020-08-22 NOTE — PROGRESS NOTE ADULT - ASSESSMENT
Impression:  1. STEMI  2. V fib arrest  3. CAD, sp cardiac catherization  4. acute systolic heart failure -resolved  5. essential HTN    Plan:  Neuro - no focal deficits, avoid neurosuppressants, PRN analgesics for rib/muscular chest pain sp CPR    CV - hemodynamically stable, infrequent PACs, no V ectopy on tele         CP free         trop downtrending, Echo with EF 30-35%, remains with RCA/Cx disease, dLAD 100% occluded         DAPT with ASA/plavix         cont IV heparin with ACS nomogram titration         high intensity statin         cont BB, if BP stable overnight will increase dosing to 50mg BID in am for arrhythmia suppression         keep K~4 and Mg>2 for optimal arrhythmia suppression         for PCI of Cx and RCA on Monday possibly         will need addition of ACE following PCI         lipid panel and A1c in am           Pulm -  stable currently on RA, sats>90%    GI -  PO cardiac diet    Renal - Cr stable, keep even to slightly negative fluid balance as tolerates, trend Cr, strict I/Os.     Heme -  Full AC with IV heparin, no signs of active bleeding, DAPT    ID - afebrile, no indication for abx therapy at this time, cont to monitor      Endo -  Aggressive glycemic to maintain euglycemia 120- 180mg/dl in setting of MI, cont ISS coverage, BS remain stable

## 2020-08-22 NOTE — PROGRESS NOTE ADULT - SUBJECTIVE AND OBJECTIVE BOX
On Admission  08-21-20 (1d)  HPI:  Mr Plascencia received from [facility] via [facility] as a STEMI. Patient receiced intubated and unresponsive.  No family members were present.    As per outpatient urology note patient has a PMHx of HTN, Nephrolithiasis, BPH and Hematuria.     As per chart from [facility] ED dept., patient presented with x/o CP and was being admitted for NSTEMI. A heparin drip was initiated.  Before being transferred out of the ED patient went into Vfib requiring resuscitation and intubation.  Repeat EKG revealed changes in V1 through V4. STEMI called and patient was transferred to [facility]. A Cardiac Catheterization was done and patient was noted to have an occluded distal LAD, thought to be the culprit vessel.  No intervention was done at this time. (21 Aug 2020 19:44)    PAST MEDICAL & SURGICAL HISTORY:  Nephrolithiasis  BPH (benign prostatic hyperplasia)  HLD (hyperlipidemia)  HTN (hypertension)      Antimicrobial:    Cardiovascular:  aMIOdarone Infusion 0.5 mG/Min IV Continuous <Continuous>  metoprolol tartrate 12.5 milliGRAM(s) Oral two times a day    Pulmonary:    Hematalogic:  aspirin  chewable 81 milliGRAM(s) Oral daily  clopidogrel Tablet 75 milliGRAM(s) Oral daily  heparin   Injectable 4000 Unit(s) IV Push every 6 hours PRN  heparin  Infusion.  Unit(s)/Hr IV Continuous <Continuous>    Other:  acetaminophen    Suspension .. 650 milliGRAM(s) Oral every 6 hours PRN  atorvastatin 80 milliGRAM(s) Oral at bedtime  chlorhexidine 4% Liquid 1 Application(s) Topical <User Schedule>  insulin lispro (HumaLOG) corrective regimen sliding scale   SubCutaneous three times a day before meals  magnesium sulfate  IVPB 1 Gram(s) IV Intermittent once  potassium phosphate IVPB 15 milliMole(s) IV Intermittent once      Drug Dosing Weight  Height (cm): 172.7 (21 Aug 2020 20:00)  Weight (kg): 89.9 (21 Aug 2020 19:46)  BMI (kg/m2): 30.1 (21 Aug 2020 20:00)  BSA (m2): 2.04 (21 Aug 2020 20:00)    T(C): 37.9 (08-22-20 @ 07:38), Max: 37.9 (08-22-20 @ 03:17)  HR: 89 (08-22-20 @ 08:00)  BP: 119/64 (08-22-20 @ 08:00)  BP(mean): 85 (08-22-20 @ 08:00)  ABP: --  ABP(mean): --  RR: 21 (08-22-20 @ 08:00)  SpO2: 97% (08-22-20 @ 08:00)    ABG - ( 21 Aug 2020 19:50 )  pH, Arterial: 7.30  pH, Blood: x     /  pCO2: 39    /  pO2: 100   / HCO3: 19    / Base Excess: -6.6  /  SaO2: 98                    08-21 @ 07:01  -  08-22 @ 07:00  --------------------------------------------------------  IN: 290.4 mL / OUT: 422 mL / NET: -131.6 mL        Mode: AC/ CMV (Assist Control/ Continuous Mandatory Ventilation)  RR (machine): 14  TV (machine): 450  FiO2: 50  PEEP: 5  MAP: 9  PIP: 21        LABS:  CBC Full  -  ( 22 Aug 2020 03:34 )  WBC Count : 19.61 K/uL  RBC Count : 4.59 M/uL  Hemoglobin : 14.3 g/dL  Hematocrit : 44.1 %  Platelet Count - Automated : 223 K/uL  Mean Cell Volume : 96.1 fl  Mean Cell Hemoglobin : 31.2 pg  Mean Cell Hemoglobin Concentration : 32.4 gm/dL  Auto Neutrophil # : 17.55 K/uL  Auto Lymphocyte # : 0.94 K/uL  Auto Monocyte # : 0.97 K/uL  Auto Eosinophil # : 0.00 K/uL  Auto Basophil # : 0.03 K/uL  Auto Neutrophil % : 89.5 %  Auto Lymphocyte % : 4.8 %  Auto Monocyte % : 4.9 %  Auto Eosinophil % : 0.0 %  Auto Basophil % : 0.2 %    08-22    137  |  100  |  12.0  ----------------------------<  146<H>  4.1   |  21.0<L>  |  0.89    Ca    9.1      22 Aug 2020 03:34  Phos  2.2     08-22  Mg     1.8     08-22    TPro  7.5  /  Alb  4.0  /  TBili  0.8  /  DBili  x   /  AST  101<H>  /  ALT  34  /  AlkPhos  91  08-22    PT/INR - ( 22 Aug 2020 03:34 )   PT: 12.2 sec;   INR: 1.05 ratio         PTT - ( 22 Aug 2020 03:34 )  PTT:45.1 sec      ____________________________________________________________________________________________________

## 2020-08-22 NOTE — PROGRESS NOTE ADULT - ASSESSMENT
HPI/INTERVAL EVENTS: extubated this AM, doesn't remember what happened to him, complains of chest pain when he takes a deep breath or sneezes     EXAM:   GENERAL: NAD, alert and oriented   HEAD: Atraumatic, normocephalic,   EYES: EOMI, PERRL  ENMT: MMM,   NECK: supple, trachea midline,  NERVOUS SYSTEM: alert and oriented x 3, no motor deficits noted,   CHEST/LUNG: bilat air entry, no chest deformity,  HEART: regular rhythm, regular rate, sinus rhythm in 90s  ABDOMEN: nontender, soft, nondistended, no rebound or guarding,   EXTREMITIES: trace edema, no clubbing, no cyanosis,   LYMPH: no lymphadenopathy,    SKIN: good capillary refill,   PSYCH: appropriate affect and behavior,     RADIOLOGY REVIEWED:  cxr- no infiltrates or large effusions     IMPRESSION/ASSESSMENT & PLAN:   88 yo male with hx of HTN, HLD, BPH, nephrolithiasis, presented to [facility] on 8/21 with chest pain, suffered a VF cardiac arrest and was successfully resuscitated, transferred to [facility].  Underwent LHC on 8/21 showing distal LAD lesion, along with 70% pLCX lesion, 80% pRCA lesion.  No intervention performed given uncertainty of his neurologic prognosis.    Today patient is alert and oriented, extubated, no neurologic deficits.    NSTEMI/ VF arrest  - ASA, plavix, statin, BB, heparin drip  - TTE pending  - plan for return to cath lab for PCI  - currently on amiodarone infusion, will most likely stop soon and uptitrate beta blocker    Acute respiratory failure  Resolved, extubated    Diet: Dash diet  Activity: oob  DVT proph: heparin   Son was updated by phone this AM

## 2020-08-22 NOTE — PROGRESS NOTE ADULT - SUBJECTIVE AND OBJECTIVE BOX
Patient is a 89y old  Male who presents with a chief complaint of Patient received from [facility] via Helicopter as a STEMI. (22 Aug 2020 12:46)      BRIEF HOSPITAL COURSE: ***    Events last 24 hours: ***    PAST MEDICAL & SURGICAL HISTORY:  Nephrolithiasis  BPH (benign prostatic hyperplasia)  HLD (hyperlipidemia)  HTN (hypertension)      Review of Systems:  CONSTITUTIONAL: No fever, chills, or fatigue  EYES: No eye pain, visual disturbances, or discharge  ENMT:  No difficulty hearing, tinnitus, vertigo; No sinus or throat pain  NECK: No pain or stiffness  RESPIRATORY: No cough, wheezing, chills or hemoptysis; No shortness of breath  CARDIOVASCULAR: No chest pain, palpitations, dizziness, or leg swelling  GASTROINTESTINAL: No abdominal or epigastric pain. No nausea, vomiting, or hematemesis; No diarrhea or constipation. No melena or hematochezia.  GENITOURINARY: No dysuria, frequency, hematuria, or incontinence  NEUROLOGICAL: No headaches, memory loss, loss of strength, numbness, or tremors  SKIN: No itching, burning, rashes, or lesions   MUSCULOSKELETAL: No joint pain or swelling; No muscle, back, or extremity pain  PSYCHIATRIC: No depression, anxiety, mood swings, or difficulty sleeping      Medications:    metoprolol tartrate 25 milliGRAM(s) Oral two times a day      acetaminophen   Tablet .. 975 milliGRAM(s) Oral every 8 hours  HYDROmorphone  Injectable 0.5 milliGRAM(s) IV Push every 3 hours PRN      aspirin  chewable 81 milliGRAM(s) Oral daily  clopidogrel Tablet 75 milliGRAM(s) Oral daily  heparin   Injectable 4000 Unit(s) IV Push every 6 hours PRN  heparin  Infusion.  Unit(s)/Hr IV Continuous <Continuous>        atorvastatin 80 milliGRAM(s) Oral at bedtime  insulin lispro (HumaLOG) corrective regimen sliding scale   SubCutaneous three times a day before meals        chlorhexidine 4% Liquid 1 Application(s) Topical <User Schedule>            ICU Vital Signs Last 24 Hrs  T(C): 36.9 (22 Aug 2020 15:38), Max: 37.9 (22 Aug 2020 03:17)  T(F): 98.4 (22 Aug 2020 15:38), Max: 100.2 (22 Aug 2020 03:17)  HR: 80 (22 Aug 2020 22:00) (76 - 100)  BP: 125/87 (22 Aug 2020 22:00) (96/57 - 138/72)  BP(mean): 101 (22 Aug 2020 22:00) (70 - 101)  ABP: --  ABP(mean): --  RR: 22 (22 Aug 2020 22:00) (18 - 28)  SpO2: 96% (22 Aug 2020 22:00) (95% - 99%)      ABG - ( 21 Aug 2020 19:50 )  pH, Arterial: 7.30  pH, Blood: x     /  pCO2: 39    /  pO2: 100   / HCO3: 19    / Base Excess: -6.6  /  SaO2: 98                        LABS:                        13.7   16.61 )-----------( 190      ( 22 Aug 2020 19:45 )             42.0     08-22    133<L>  |  95<L>  |  17.0  ----------------------------<  87  4.5   |  23.0  |  1.23    Ca    8.8      22 Aug 2020 19:45  Phos  2.2     08-22  Mg     2.1     08-22    TPro  7.5  /  Alb  4.0  /  TBili  0.8  /  DBili  x   /  AST  101<H>  /  ALT  34  /  AlkPhos  91  08-22      CARDIAC MARKERS ( 22 Aug 2020 10:25 )  x     / 1.57 ng/mL / 829 U/L / x     / 63.0 ng/mL  CARDIAC MARKERS ( 22 Aug 2020 03:34 )  x     / 2.21 ng/mL / x     / x     / x      CARDIAC MARKERS ( 21 Aug 2020 19:47 )  x     / 0.82 ng/mL / x     / x     / x          CAPILLARY BLOOD GLUCOSE      POCT Blood Glucose.: 103 mg/dL (22 Aug 2020 22:07)    PT/INR - ( 22 Aug 2020 03:34 )   PT: 12.2 sec;   INR: 1.05 ratio         PTT - ( 22 Aug 2020 16:10 )  PTT:70.0 sec    CULTURES:      Physical Examination:    General: No acute distress.  Alert, oriented, interactive, nonfocal    HEENT: Pupils equal, reactive to light.  Symmetric.    PULM: Clear to auscultation bilaterally, no significant sputum production    CVS: Regular rate and rhythm, no murmurs, rubs, or gallops    ABD: Soft, nondistended, nontender, normoactive bowel sounds, no masses    EXT: No edema, nontender    SKIN: Warm and well perfused, no rashes noted.    RADIOLOGY: ***    CRITICAL CARE TIME SPENT: *** Patient is a 89y old  Male who presents with a chief complaint of Patient received from [facility] via Helicopter as a STEMI. (22 Aug 2020 12:46)      BRIEF HOSPITAL COURSE:   89M with PMHx of kidney stones, BPH, HLD, HTN who presented to [place] with CP. Admitted for NSTEMI, placed on IV heparin infusion. Pt with subsequent Vfib arrest in ED requiring intubation. EKG revealed DIPTI in V1-V4. Pt emergently transferred to [facility] as CODE STEMI. Emergently to cath lab. Found to have dLAD 100% (presumed culprit), pCx 70%, pRCA 80%, EF 30%. No intervention was performed due to the pts lack of mental status at the time and coronary anatomy      Events last 24 hours: Pt awake conversant, remains extubated. Remains hemodynamically stable, afebrile, infrequent PACS and brief pAF, no Ventricular ectopy noted, tolerating PO. Denies CP, SOB, abd pain, N/V, back pain, leg pain, HA, blurry vision.    PAST MEDICAL & SURGICAL HISTORY:  Nephrolithiasis  BPH (benign prostatic hyperplasia)  HLD (hyperlipidemia)  HTN (hypertension)      Review of Systems:  10pt ROS negative unless otherwise stated above      Medications:    metoprolol tartrate 25 milliGRAM(s) Oral two times a day      acetaminophen   Tablet .. 975 milliGRAM(s) Oral every 8 hours  HYDROmorphone  Injectable 0.5 milliGRAM(s) IV Push every 3 hours PRN      aspirin  chewable 81 milliGRAM(s) Oral daily  clopidogrel Tablet 75 milliGRAM(s) Oral daily  heparin   Injectable 4000 Unit(s) IV Push every 6 hours PRN  heparin  Infusion.  Unit(s)/Hr IV Continuous <Continuous>        atorvastatin 80 milliGRAM(s) Oral at bedtime  insulin lispro (HumaLOG) corrective regimen sliding scale   SubCutaneous three times a day before meals        chlorhexidine 4% Liquid 1 Application(s) Topical <User Schedule>            ICU Vital Signs Last 24 Hrs  T(C): 36.9 (22 Aug 2020 15:38), Max: 37.9 (22 Aug 2020 03:17)  T(F): 98.4 (22 Aug 2020 15:38), Max: 100.2 (22 Aug 2020 03:17)  HR: 80 (22 Aug 2020 22:00) (76 - 100)  BP: 125/87 (22 Aug 2020 22:00) (96/57 - 138/72)  BP(mean): 101 (22 Aug 2020 22:00) (70 - 101)  ABP: --  ABP(mean): --  RR: 22 (22 Aug 2020 22:00) (18 - 28)  SpO2: 96% (22 Aug 2020 22:00) (95% - 99%)      ABG - ( 21 Aug 2020 19:50 )  pH, Arterial: 7.30  pH, Blood: x     /  pCO2: 39    /  pO2: 100   / HCO3: 19    / Base Excess: -6.6  /  SaO2: 98        I&O's Summary    21 Aug 2020 07:01  -  22 Aug 2020 07:00  --------------------------------------------------------  IN: 290.4 mL / OUT: 422 mL / NET: -131.6 mL    22 Aug 2020 07:01  -  22 Aug 2020 23:09  --------------------------------------------------------  IN: 168 mL / OUT: 260 mL / NET: -92 mL                    LABS:                        13.7   16.61 )-----------( 190      ( 22 Aug 2020 19:45 )             42.0     08-22    133<L>  |  95<L>  |  17.0  ----------------------------<  87  4.5   |  23.0  |  1.23    Ca    8.8      22 Aug 2020 19:45  Phos  2.2     08-22  Mg     2.1     08-22    TPro  7.5  /  Alb  4.0  /  TBili  0.8  /  DBili  x   /  AST  101<H>  /  ALT  34  /  AlkPhos  91  08-22      CARDIAC MARKERS ( 22 Aug 2020 10:25 )  x     / 1.57 ng/mL / 829 U/L / x     / 63.0 ng/mL  CARDIAC MARKERS ( 22 Aug 2020 03:34 )  x     / 2.21 ng/mL / x     / x     / x      CARDIAC MARKERS ( 21 Aug 2020 19:47 )  x     / 0.82 ng/mL / x     / x     / x          CAPILLARY BLOOD GLUCOSE      POCT Blood Glucose.: 103 mg/dL (22 Aug 2020 22:07)    PT/INR - ( 22 Aug 2020 03:34 )   PT: 12.2 sec;   INR: 1.05 ratio         PTT - ( 22 Aug 2020 16:10 )  PTT:70.0 sec        Physical Examination:    General: No acute distress.  Alert, oriented x3 , interactive, nonfocal    HEENT: Pupils equal, reactive to light.  Symmetric, sclera anicteric    PULM: Clear to auscultation bilaterally    CVS: Regular rate and rhythm    ABD: Soft, nondistended, nontender, normoactive bowel sounds, no rebound or guarding, R groin cath access site c/d, no hematoma    EXT: No edema, nontender, SCDs in place    SKIN: Warm and well perfused, no rashes noted.    RADIOLOGY:   EXAM:  ECHO TTE WITH CON COMP W DOPP      PROCEDURE DATE:  Aug 22 2020   .      INTERPRETATION:  REPORT:  TRANSTHORACIC ECHOCARDIOGRAM REPORT        Patient Name:   LUIS MIGUEL SELLERS Patient Location: Inpatient  Medical Rec #:  [id number]   Accession #:      [id number]  Account #:      [id number]   Height:           67.7 in 172.0 cm  YOB: 1931  Weight:           198.2 lb 89.90 kg  Patient Age:    89 years   BSA:              2.03 m²  Patient Gender: M          BP:               127/66 mmHg      Date of Exam:        2020 8:46:27 AM  Sonographer:         Alfa Flores  Referring Physician: Meet Medellin    Procedure:     2D Echo/Doppler/Color Doppler Complete and Echocardiogram with                 Definity Contrast.  Indications:   ST elevation (STEMI) myocardial infarction of unspecified site -                 I21.3  Diagnosis:     ST elevation (STEMI) myocardial infarction of unspecified site -                 I21.3  Study Details: Technically difficult study. Study quality was adversely affected                 due to body habitus.        2D AND M-MODE MEASUREMENTS (normal ranges within parentheses):  Left                 Normal   Aorta/Left            Normal  Ventricle:                    Atrium:  IVSd (2D):    1.38  (0.7-1.1) Aortic Root   3.60  (2.4-3.7)                 cm             (2D):          cm  LVPWd (2D):   0.92  (0.7-1.1) Aortic Root   3.60  (2.4-3.7)                 cm             (Mmode):       cm  LVIDd (2D):   5.02  (3.4-5.7) AoV Cusp      2.19  (1.5-2.6)                 cm             Separation:    cm  LVIDs (2D):   3.71            Left Atrium   3.00  (1.9-4.0)                 cm             (2D):          cm  LV FS (2D):   26.1   (>25%)   Left Atrium   2.68  (1.9-4.0)                  %             (Mmode):       cm  Relative Wall 0.37   (<0.42)  LA Volume     16.8  Thickness                     Index        ml/m²                                Right Ventricle:                                TAPSE:           1.20 cm    LV SYSTOLIC FUNCTION BY 2D PLANIMETRY (MOD):  EF-A4C View: 37.9 % EF-A2C View: 29.4 % EF-Biplane: 35 %    LV DIASTOLIC FUNCTION:  MV Peak E: 0.37 m/s Decel Time: 140 msec  MV Peak A: 0.81 m/s  E/A Ratio: 0.45    SPECTRAL DOPPLER ANALYSIS (where applicable):  Mitral Valve:  MV P1/2 Time: 40.65 msec  MV Area, PHT: 5.41 cm²    Aortic Valve: AoV Max Nicola: 0.72 m/s AoV Peak P.1 mmHg AoV Mean P.4 mmHg    LVOT Vmax: 0.54 m/s LVOT VTI: 0.131 m LVOT Diameter: 2.41 cm    AoV Area, Vmax: 3.44 cm² AoV Area, VTI: 4.08 cm² AoV Area, Vmn:  Ao VTI: 0.147  Aortic Insufficiency:  AI Half-time:  483 msec  AI Decel Rate: 1.88 m/s²    Tricuspid Valve and PA/RV Systolic Pressure: TR Max Velocity: 2.63 m/s RA Pressure: 3 mmHg RVSP/PASP: 30.7 mmHg      PHYSICIAN INTERPRETATION:  Left Ventricle: Endocardial visualization was enhanced with intravenous echo contrast. The left ventricular internal cavity size is normal.  Left ventricular ejection fraction, by visual estimation, is 30 to 35%. Severely decreased segmental left ventricular systolic function. Spectral Doppler shows impaired relaxation pattern of left ventricular myocardial filling (Grade I diastolic dysfunction).      LV Wall Scoring:  The entire apex and mid and apical anterior septum are akinetic. The mid  inferoseptal segment, mid anterior segment, and mid inferior segment are  hypokinetic.    Right Ventricle: The right ventricle was not well visualized.  Left Atrium: The left atrium is normal in size.  Right Atrium: The right atrium was not well visualized.  Pericardium: There is no evidence of pericardial effusion. There is a significant pericardial fat pad present.  Mitral Valve: There is mild mitral annular calcification. Mild mitral valve regurgitation is seen.  Tricuspid Valve: The tricuspid valve is normal in structure. Trivial tricuspid regurgitation is visualized.  Aortic Valve: The aortic valve is trileaflet. Sclerotic aortic valve with normal opening. Peak transaortic gradient equals 2.1 mmHg, mean transaortic gradient equals 1.4 mmHg, the calculated aortic valve area equals 4.08 cm² by the continuity equation consistent with normally opening aortic valve. Mild aortic valve regurgitation is seen.  Pulmonic Valve: The pulmonic valve was not well visualized. Trace pulmonic valve regurgitation.  Aorta: The aortic root is normal in size and structure. The ascending aorta was not well visualized. The aortic arch was not well visualized.  Pulmonary Artery: The pulmonary artery is of normal size and origin.  Venous: The inferior vena cava is 1.7 cm. The inferior vena cava was normal sized, with respiratory size variation greater than 50%.  In comparison to the previous echocardiogram(s): There are no prior studies on this patient for comparison purposes. No prior study is available for comparison.      Summary:   1. Technically difficult study.   2. Endocardial visualization was enhanced with intravenous echo contrast. There is no evidence of LV thrombus.   3. Severely decreased segmental left ventricular systolic function.   4. Left ventricular ejection fraction, by visual estimation, is 30 to 35%.   5. LV Ejection Fraction by Delgadillo's Method with a biplane EF of 35 %.   6. There is akinesis of the apex, [id number] septum, distal anterior, distal inferior walls with mild hypokinesis of the mid-anterior and mid-inferior walls.     7. Spectral Doppler shows impaired relaxation pattern of left ventricular myocardial filling (Grade I diastolic dysfunction).   8. Normal RV size and function, estimated PASP least 31 mmHg.   9. Mild mitral annular calcification.  10. Mild mitral valve regurgitation.  11. Mild aortic regurgitation.  12. Sclerotic aortic valve with normal opening.  13. There is no evidence of pericardial effusion.  14. No prior study is available for comparison.    Aneta Cervantes DO Electronically signed on 2020 at 4:30:40 PM            *** Final ***                ANETA CUNNINGHAM   This document has been electronically signed. Aug 22 2020  8:46AM    Cath prelim report  LM Distal 20-30%.   LAD- mid 70-80%, distal 100%.   circumflex- proximal 70%,   RCA- proximal 80%    LV function- severely depressed.     Based on anatomy and other factors, plan for medical management     EXAM:  XR CHEST PORTABLE IMMED 1V                          PROCEDURE DATE:  2020          INTERPRETATION:  TECHNIQUE: Single portable view of the chest.    COMPARISON: None.    CLINICAL HISTORY: NG tube [id number] m    FINDINGS:    Single frontal view of the chest demonstrates the lungs to be clear mild CHF. The cardiomediastinal silhouette is enlarged. No acute osseous abnormalities. Overlying EKG leads and wires are noted. Endotracheal tube tip is above the elliott. NG tube courses below the hemidiaphragm. Excreted intravenous contrast the bilateral renal collecting systems.    IMPRESSION: Mild CHF. Cardiomegaly.              LEIF ROB M.D., ATTENDING RADIOLOGIST  This document has been electronically signed. Aug 22 2020 10:09AM

## 2020-08-22 NOTE — PROGRESS NOTE ADULT - SUBJECTIVE AND OBJECTIVE BOX
Department of Cardiology                                                   [employer]/[place] [address] [address]                                           Telephone: [phone]. Fax:[phone]                                                        Progress Note Cardiac Cath NP      Narrative:      As per outpatient urology note patient has a PMHx of HTN, Nephrolithiasis, BPH and Hematuria.     Patient was a code STEMI, transferred from [facility] ED dept., patient presented with c/o Chest Pain and was being admitted for NSTEMI. A heparin drip was initiated.  Before being transferred out of the ED patient went into Vfib requiring resuscitation and intubation.  Repeat EKG revealed changes in V1 through V4.  STEMI called and patient was transferred to [facility]. A Cardiac Catheterization was done (8/21) which showed occluded distal LAD, thought to be the culprit vessel.  No intervention was done at this time pending patients neurological status. Patient seen today, endorses generalized chest discomfort with sneezing, coughing, exerting pressure with movement. Bo sob, palpitations.     Full report to follow. Patient s/p emergent LHC with Dr. Florez via right femoral artery which showed:     LM Distal 20-30%.   LAD- mid 70-80%, distal 100%.   circumflex- proximal 70%,   RCA- proximal 80%  LV function- severely depressed.        PAST MEDICAL & SURGICAL HISTORY:  Nephrolithiasis  BPH (benign prostatic hyperplasia)  HLD (hyperlipidemia)  HTN (hypertension)    FAMILY HISTORY:    Home Medications:  Aspirin 81 oral delayed release tablet: 1 tab(s) orally once a day (21 Aug 2020 19:43)  finasteride 5 mg oral tablet: 1 tab(s) orally once a day (21 Aug 2020 19:43)  metoprolol succinate 50 mg oral tablet, extended release: 1 tab(s) orally once a day (21 Aug 2020 19:43)  Norvasc 10 mg oral tablet: 1 tab(s) orally once a day (21 Aug 2020 19:43)  omeprazole 20 mg oral delayed release capsule: 1 cap(s) orally once a day (21 Aug 2020 19:43)  pravastatin 40 mg oral tablet: 1 tab(s) orally once a day (21 Aug 2020 19:43)  tamsulosin 0.4 mg oral capsule: orally 2 times a day (21 Aug 2020 19:43)                         14.3   19.61 )-----------( 223      ( 22 Aug 2020 03:34 )             44.1     08-22    137  |  100  |  12.0  ----------------------------<  146<H>  4.1   |  21.0<L>  |  0.89    Ca    9.1      22 Aug 2020 03:34  Phos  2.2     08-22  Mg     1.8     08-22    TPro  7.5  /  Alb  4.0  /  TBili  0.8  /  DBili  x   /  AST  101<H>  /  ALT  34  /  AlkPhos  91  08-22    PT/INR - ( 22 Aug 2020 03:34 )   PT: 12.2 sec;   INR: 1.05 ratio    PTT - ( 22 Aug 2020 10:24 )  PTT:69.6 sec      Objective:  Vital Signs Last 24 Hrs  T(C): 36.9 (22 Aug 2020 12:04), Max: 37.9 (22 Aug 2020 03:17)  T(F): 98.4 (22 Aug 2020 12:04), Max: 100.2 (22 Aug 2020 03:17)  HR: 89 (22 Aug 2020 08:00) (88 - 106)  BP: 119/64 (22 Aug 2020 08:00) (86/54 - 145/59)  BP(mean): 85 (22 Aug 2020 08:00) (65 - 99)  RR: 21 (22 Aug 2020 08:00) (16 - 30)  SpO2: 97% (22 Aug 2020 08:00) (95% - 100%)      General: No fatigue, no fevers/chills  Respiratory: No dyspnea, no cough, no wheeze  CV: No chest pain, no palpitations  Abd: No nausea  Neuro: No headache, no dizziness  Allergy Status Unknown    CM: SR  Neuro: A&OX3, CN 2-12 intact  HEENT: NC, AT  Lungs: CTA B/L  CV: S1, S2, no murmur, RRR  Abd: Soft  Right Groin: R groin site  dressing CDI no bleeding no hematoma noted, site soft non tender, positive pedal pulses bilateral   Extremity: + distal pulses      EKG:   (8/22)  NSR, non-specific ST abnormality;  ms, QTS 80 ms      DIAGNOSTICS:    Final report of [facility] 8/21 to follow       ASSESSMENT AND PLAN:     Patient was a code STEMI, transferred from [facility] ED dept., patient presented with c/o Chest Pain and was being admitted for NSTEMI. A heparin drip was initiated.  Before being transferred out of the ED patient went into Vfib requiring resuscitation and intubation.  Repeat EKG revealed changes in V1 through V4.  STEMI called and patient was transferred to [facility]. A Cardiac Catheterization was done (8/21) which showed occluded distal LAD, thought to be the culprit vessel.  No intervention was done at this time pending patients neurological status. Patient seen today, endorses generalized chest discomfort with sneezing, coughing, exerting pressure with movement. Bo sob, palpitations.       - pt now extubated and tolerating room air, VSS  - R groin site  dressing CDI no bleeding no hematoma noted, site soft non tender, positive pedal pulses bilat   - pt s/p LHC which showed CAD in LM Distal, mLAD, pCirc and pRCA; based on anatomy and other factors, plan for medical management; Aspirin, plavix, statin, beta blockers started metoprolol 25 mg BID   - pt continues on amiodarone gtt until 2 pm today, will discuss with attending further reccs   - continue DAPT (ASA and Plavix)  - pt remains on heparin gtt pending possible PCI   - likely PCI to Circumflex and RCA pending pts status on Monday and if in agreement   - will discuss recommendations with attending Department of Cardiology                                                   [facility]/[place] [address] [address]                                           Telephone: [phone]. Fax:[phone]                                                        Progress Note Cardiac Cath NP      Narrative:      As per outpatient urology note patient has a PMHx of HTN, Nephrolithiasis, BPH and Hematuria.     Patient was a code STEMI, transferred from [facility] ED dept., patient presented with c/o Chest Pain and was being admitted for NSTEMI. A heparin drip was initiated.  Before being transferred out of the ED patient went into Vfib requiring resuscitation and intubation.  Repeat EKG revealed changes in V1 through V4.  STEMI called and patient was transferred to [facility]. A Cardiac Catheterization was done (8/21) which showed occluded distal LAD, thought to be the culprit vessel.  No intervention was done at this time pending patients neurological status. Patient seen today, endorses generalized chest discomfort with sneezing, coughing, exerting pressure with movement. Bo sob, palpitations.     Full report to follow. Patient s/p emergent LHC with Dr. Florez via right femoral artery which showed:     LM Distal 20-30%.   LAD- mid 70-80%, distal 100%.   circumflex- proximal 70%,   RCA- proximal 80%  LV function- severely depressed.        PAST MEDICAL & SURGICAL HISTORY:  Nephrolithiasis  BPH (benign prostatic hyperplasia)  HLD (hyperlipidemia)  HTN (hypertension)    FAMILY HISTORY:    Home Medications:  Aspirin 81 oral delayed release tablet: 1 tab(s) orally once a day (21 Aug 2020 19:43)  finasteride 5 mg oral tablet: 1 tab(s) orally once a day (21 Aug 2020 19:43)  metoprolol succinate 50 mg oral tablet, extended release: 1 tab(s) orally once a day (21 Aug 2020 19:43)  Norvasc 10 mg oral tablet: 1 tab(s) orally once a day (21 Aug 2020 19:43)  omeprazole 20 mg oral delayed release capsule: 1 cap(s) orally once a day (21 Aug 2020 19:43)  pravastatin 40 mg oral tablet: 1 tab(s) orally once a day (21 Aug 2020 19:43)  tamsulosin 0.4 mg oral capsule: orally 2 times a day (21 Aug 2020 19:43)                         14.3   19.61 )-----------( 223      ( 22 Aug 2020 03:34 )             44.1     08-22    137  |  100  |  12.0  ----------------------------<  146<H>  4.1   |  21.0<L>  |  0.89    Ca    9.1      22 Aug 2020 03:34  Phos  2.2     08-22  Mg     1.8     08-22    TPro  7.5  /  Alb  4.0  /  TBili  0.8  /  DBili  x   /  AST  101<H>  /  ALT  34  /  AlkPhos  91  08-22    PT/INR - ( 22 Aug 2020 03:34 )   PT: 12.2 sec;   INR: 1.05 ratio    PTT - ( 22 Aug 2020 10:24 )  PTT:69.6 sec      Objective:  Vital Signs Last 24 Hrs  T(C): 36.9 (22 Aug 2020 12:04), Max: 37.9 (22 Aug 2020 03:17)  T(F): 98.4 (22 Aug 2020 12:04), Max: 100.2 (22 Aug 2020 03:17)  HR: 89 (22 Aug 2020 08:00) (88 - 106)  BP: 119/64 (22 Aug 2020 08:00) (86/54 - 145/59)  BP(mean): 85 (22 Aug 2020 08:00) (65 - 99)  RR: 21 (22 Aug 2020 08:00) (16 - 30)  SpO2: 97% (22 Aug 2020 08:00) (95% - 100%)      General: No fatigue, no fevers/chills  Respiratory: No dyspnea, no cough, no wheeze  CV: No chest pain, no palpitations  Abd: No nausea  Neuro: No headache, no dizziness  Allergy Status Unknown    CM: SR  Neuro: A&OX3, CN 2-12 intact  HEENT: NC, AT  Lungs: CTA B/L  CV: S1, S2, no murmur, RRR  Abd: Soft  Right Groin: R groin site  dressing CDI no bleeding no hematoma noted, site soft non tender, positive pedal pulses bilateral   Extremity: + distal pulses      EKG:   (8/22)  NSR, non-specific ST abnormality;  ms, QTS 80 ms      DIAGNOSTICS:    Final report of [facility] 8/21 to follow       ASSESSMENT AND PLAN:     Patient was a code STEMI, transferred from [facility] ED dept., patient presented with c/o Chest Pain and was being admitted for NSTEMI. A heparin drip was initiated.  Before being transferred out of the ED patient went into Vfib requiring resuscitation and intubation.  Repeat EKG revealed changes in V1 through V4.  STEMI called and patient was transferred to [facility]. A Cardiac Catheterization was done (8/21) which showed occluded distal LAD, thought to be the culprit vessel.  No intervention was done at this time pending patients neurological status. Patient seen today, endorses generalized chest discomfort with sneezing, coughing, exerting pressure with movement. Bo sob, palpitations.       - pt now extubated and tolerating room air, VSS  - R groin site  dressing CDI no bleeding no hematoma noted, site soft non tender, positive pedal pulses bilat   - pt s/p LHC which showed CAD in LM Distal, mLAD, pCirc and pRCA; based on anatomy and other factors, plan for medical management with Aspirin, Plavix, statin, beta blockers started (metoprolol 25 mg BID)  - pt continues on amiodarone gtt likely in setting of VFib arrest, will discontinue at this time since there is no documented AFib thus far - discussed with MICU PA   - continue DAPT (ASA and Plavix) for management of CAD  - pt remains on heparin gtt pending possible PCI    - likely PCI to Circumflex and RCA pending pts status on Monday and if in agreement   - above discussed with Dr. Lala

## 2020-08-23 LAB
A1C WITH ESTIMATED AVERAGE GLUCOSE RESULT: 5.7 % — HIGH (ref 4–5.6)
ALBUMIN SERPL ELPH-MCNC: 3.5 G/DL — SIGNIFICANT CHANGE UP (ref 3.3–5.2)
ALP SERPL-CCNC: 74 U/L — SIGNIFICANT CHANGE UP (ref 40–120)
ALT FLD-CCNC: 24 U/L — SIGNIFICANT CHANGE UP
ANION GAP SERPL CALC-SCNC: 13 MMOL/L — SIGNIFICANT CHANGE UP (ref 5–17)
APTT BLD: 37.9 SEC — HIGH (ref 27.5–35.5)
APTT BLD: 48.9 SEC — HIGH (ref 27.5–35.5)
APTT BLD: 80.3 SEC — HIGH (ref 27.5–35.5)
AST SERPL-CCNC: 48 U/L — HIGH
BILIRUB SERPL-MCNC: 1 MG/DL — SIGNIFICANT CHANGE UP (ref 0.4–2)
BUN SERPL-MCNC: 16 MG/DL — SIGNIFICANT CHANGE UP (ref 8–20)
CALCIUM SERPL-MCNC: 8.7 MG/DL — SIGNIFICANT CHANGE UP (ref 8.6–10.2)
CHLORIDE SERPL-SCNC: 100 MMOL/L — SIGNIFICANT CHANGE UP (ref 98–107)
CHOLEST SERPL-MCNC: 110 MG/DL — SIGNIFICANT CHANGE UP (ref 110–199)
CO2 SERPL-SCNC: 23 MMOL/L — SIGNIFICANT CHANGE UP (ref 22–29)
CREAT SERPL-MCNC: 1.03 MG/DL — SIGNIFICANT CHANGE UP (ref 0.5–1.3)
ESTIMATED AVERAGE GLUCOSE: 117 MG/DL — HIGH (ref 68–114)
GLUCOSE BLDC GLUCOMTR-MCNC: 109 MG/DL — HIGH (ref 70–99)
GLUCOSE BLDC GLUCOMTR-MCNC: 109 MG/DL — HIGH (ref 70–99)
GLUCOSE BLDC GLUCOMTR-MCNC: 113 MG/DL — HIGH (ref 70–99)
GLUCOSE BLDC GLUCOMTR-MCNC: 124 MG/DL — HIGH (ref 70–99)
GLUCOSE SERPL-MCNC: 116 MG/DL — HIGH (ref 70–99)
HCT VFR BLD CALC: 42.6 % — SIGNIFICANT CHANGE UP (ref 39–50)
HDLC SERPL-MCNC: 45 MG/DL — SIGNIFICANT CHANGE UP
HGB BLD-MCNC: 13.8 G/DL — SIGNIFICANT CHANGE UP (ref 13–17)
LIPID PNL WITH DIRECT LDL SERPL: 46 MG/DL — SIGNIFICANT CHANGE UP
MAGNESIUM SERPL-MCNC: 2 MG/DL — SIGNIFICANT CHANGE UP (ref 1.8–2.6)
MCHC RBC-ENTMCNC: 30.7 PG — SIGNIFICANT CHANGE UP (ref 27–34)
MCHC RBC-ENTMCNC: 32.4 GM/DL — SIGNIFICANT CHANGE UP (ref 32–36)
MCV RBC AUTO: 94.7 FL — SIGNIFICANT CHANGE UP (ref 80–100)
PHOSPHATE SERPL-MCNC: 2.4 MG/DL — SIGNIFICANT CHANGE UP (ref 2.4–4.7)
PLATELET # BLD AUTO: 153 K/UL — SIGNIFICANT CHANGE UP (ref 150–400)
POTASSIUM SERPL-MCNC: 3.7 MMOL/L — SIGNIFICANT CHANGE UP (ref 3.5–5.3)
POTASSIUM SERPL-SCNC: 3.7 MMOL/L — SIGNIFICANT CHANGE UP (ref 3.5–5.3)
PROT SERPL-MCNC: 6.5 G/DL — LOW (ref 6.6–8.7)
RBC # BLD: 4.5 M/UL — SIGNIFICANT CHANGE UP (ref 4.2–5.8)
RBC # FLD: 13.5 % — SIGNIFICANT CHANGE UP (ref 10.3–14.5)
SODIUM SERPL-SCNC: 136 MMOL/L — SIGNIFICANT CHANGE UP (ref 135–145)
TOTAL CHOLESTEROL/HDL RATIO MEASUREMENT: 2 RATIO — LOW (ref 3.4–9.6)
TRIGL SERPL-MCNC: 93 MG/DL — SIGNIFICANT CHANGE UP (ref 10–200)
WBC # BLD: 13.81 K/UL — HIGH (ref 3.8–10.5)
WBC # FLD AUTO: 13.81 K/UL — HIGH (ref 3.8–10.5)

## 2020-08-23 PROCEDURE — 99233 SBSQ HOSP IP/OBS HIGH 50: CPT

## 2020-08-23 PROCEDURE — 93010 ELECTROCARDIOGRAM REPORT: CPT

## 2020-08-23 RX ORDER — FINASTERIDE 5 MG/1
5 TABLET, FILM COATED ORAL DAILY
Refills: 0 | Status: DISCONTINUED | OUTPATIENT
Start: 2020-08-23 | End: 2020-08-26

## 2020-08-23 RX ORDER — SACCHAROMYCES BOULARDII 250 MG
250 POWDER IN PACKET (EA) ORAL
Refills: 0 | Status: DISCONTINUED | OUTPATIENT
Start: 2020-08-23 | End: 2020-08-26

## 2020-08-23 RX ORDER — METOPROLOL TARTRATE 50 MG
50 TABLET ORAL
Refills: 0 | Status: DISCONTINUED | OUTPATIENT
Start: 2020-08-23 | End: 2020-08-24

## 2020-08-23 RX ORDER — CEPHALEXIN 500 MG
500 CAPSULE ORAL EVERY 12 HOURS
Refills: 0 | Status: COMPLETED | OUTPATIENT
Start: 2020-08-23 | End: 2020-08-26

## 2020-08-23 RX ADMIN — HEPARIN SODIUM 1300 UNIT(S)/HR: 5000 INJECTION INTRAVENOUS; SUBCUTANEOUS at 16:42

## 2020-08-23 RX ADMIN — ATORVASTATIN CALCIUM 80 MILLIGRAM(S): 80 TABLET, FILM COATED ORAL at 22:46

## 2020-08-23 RX ADMIN — Medication 250 MILLIGRAM(S): at 18:09

## 2020-08-23 RX ADMIN — Medication 975 MILLIGRAM(S): at 15:11

## 2020-08-23 RX ADMIN — Medication 81 MILLIGRAM(S): at 15:00

## 2020-08-23 RX ADMIN — Medication 500 MILLIGRAM(S): at 15:00

## 2020-08-23 RX ADMIN — Medication 975 MILLIGRAM(S): at 06:13

## 2020-08-23 RX ADMIN — Medication 975 MILLIGRAM(S): at 05:48

## 2020-08-23 RX ADMIN — HYDROMORPHONE HYDROCHLORIDE 0.5 MILLIGRAM(S): 2 INJECTION INTRAMUSCULAR; INTRAVENOUS; SUBCUTANEOUS at 09:38

## 2020-08-23 RX ADMIN — HYDROMORPHONE HYDROCHLORIDE 0.5 MILLIGRAM(S): 2 INJECTION INTRAMUSCULAR; INTRAVENOUS; SUBCUTANEOUS at 00:58

## 2020-08-23 RX ADMIN — CLOPIDOGREL BISULFATE 75 MILLIGRAM(S): 75 TABLET, FILM COATED ORAL at 15:00

## 2020-08-23 RX ADMIN — Medication 50 MILLIGRAM(S): at 05:48

## 2020-08-23 RX ADMIN — Medication 975 MILLIGRAM(S): at 22:45

## 2020-08-23 RX ADMIN — HYDROMORPHONE HYDROCHLORIDE 0.5 MILLIGRAM(S): 2 INJECTION INTRAMUSCULAR; INTRAVENOUS; SUBCUTANEOUS at 01:18

## 2020-08-23 RX ADMIN — HEPARIN SODIUM 1100 UNIT(S)/HR: 5000 INJECTION INTRAVENOUS; SUBCUTANEOUS at 09:36

## 2020-08-23 RX ADMIN — Medication 50 MILLIGRAM(S): at 18:09

## 2020-08-23 RX ADMIN — FINASTERIDE 5 MILLIGRAM(S): 5 TABLET, FILM COATED ORAL at 15:00

## 2020-08-23 RX ADMIN — HYDROMORPHONE HYDROCHLORIDE 0.5 MILLIGRAM(S): 2 INJECTION INTRAMUSCULAR; INTRAVENOUS; SUBCUTANEOUS at 10:11

## 2020-08-23 NOTE — PROGRESS NOTE ADULT - ASSESSMENT
89 y/o with pmhx of BPH, HLD, HTN admitted s/p cardiac arrest due to STEMI  from [facility] ; before being transferred out of the ED patient went into Vfib requiring resuscitation and intubation.  Repeat EKG revealed changes in V1 through V4. STEMI called and patient was transferred to [facility]. A Cardiac Catheterization was done and patient was noted to have an occluded distal LAD, thought to be the culprit vessel.  No intervention was done at this time  pending patients neurological status    1- CAD with STEMI from [facility]   pt is s/p LHC no intervention   PCI on Monday ?   cont aspirin  , B blocker iv heparin     2- VF arrest in [facility] s/p CPR   on amio   stable at present   cont cardiac monitor     3- Left arm phlebitis   cont warm compress ,  start empirical po keflex extensive skin swelling redness warm     discussed with nurse and the patient 89 y/o with pmhx of BPH, HLD, HTN admitted s/p cardiac arrest due to STEMI  from [facility] ; before being transferred out of the ED patient went into Vfib requiring resuscitation and intubation.  Repeat EKG revealed changes in V1 through V4. STEMI called and patient was transferred to [facility]. A Cardiac Catheterization was done and patient was noted to have an occluded distal LAD, thought to be the culprit vessel.  No intervention was done at this time  pending patients neurological status    1- CAD with STEMI from [facility]   pt is s/p LHC no intervention   PCI on Monday ?   cont aspirin  , B blocker iv heparin     2- VF arrest in [facility] s/ CPR   s/p  amio   stable at present   cont cardiac monitor     3- Left arm phlebitis   cont warm compress ,  start empirical po keflex extensive skin swelling redness warm     4- Dm type 2 controlled BG   cont sliding scale insulin coverage   discussed with nurse and the patient

## 2020-08-23 NOTE — PROGRESS NOTE ADULT - ASSESSMENT
A/P: Patient was a code STEMI, transferred from [facility] ED dept., patient presented with c/o Chest Pain and was being admitted for NSTEMI. A heparin drip was initiated.  Before being transferred out of the ED patient went into Vfib requiring resuscitation and intubation.  Repeat EKG revealed changes in V1 through V4.  STEMI called and patient was transferred to [facility]. A Cardiac Catheterization was done (8/21) which showed occluded distal LAD, thought to be the culprit vessel.  No intervention was done at this time pending patients neurological status. Patient seen today, endorses generalized chest discomfort with sneezing, coughing, exerting pressure with movement. Bo sob, palpitations.     Coronary Artery Disease  - pt now extubated and tolerating room air, VSS  - pt s/p LHC which showed CAD in LM Distal, mLAD, pCirc and pRCA;   - Patient A&O x 3, with plans for PCI to Cx and RCA.   - Continue aspirin, plavix, heparin gtt, metoprolol, and statin.   - NPO after midnight.     Ventricular Fibrillation Arrest   - S/p Amio load   - Some NSVT overnight.   - Metoprolol increased 50mg PO BID.   - PCI tomorrow.   - If further ectopy, would consider PO Amio     Preliminary evaluation, please await official recommendations by Dr. Lala A/P: Patient was a code STEMI, transferred from [facility] ED dept., patient presented with c/o Chest Pain and was being admitted for NSTEMI. A heparin drip was initiated.  Before being transferred out of the ED patient went into Vfib requiring resuscitation and intubation.  Repeat EKG revealed changes in V1 through V4.  STEMI called and patient was transferred to [facility]. A Cardiac Catheterization was done (8/21) which showed occluded distal LAD, thought to be the culprit vessel.  No intervention was done at this time pending patients neurological status. Patient seen today, endorses generalized chest discomfort with sneezing, coughing, exerting pressure with movement. Bo sob, palpitations.     Coronary Artery Disease  - pt now extubated and tolerating room air, VSS  - pt s/p LHC which showed CAD in LM Distal, mLAD, pCirc and pRCA;   - Patient A&O x 3, with plans for PCI to Cx and RCA.   - Continue aspirin, plavix, heparin gtt, metoprolol, and statin.   - NPO after midnight.     Ventricular Fibrillation Arrest   - S/p IV Amiodarone, now off  - Some NSVT overnight.   - Metoprolol increased 50mg PO BID.   - PCI tomorrow.   - If further ectopy, would consider PO Amio loading     Preliminary evaluation, please await official recommendations by Dr. Lala

## 2020-08-23 NOTE — DIETITIAN INITIAL EVALUATION ADULT. - OTHER INFO
89 y/o with pmhx of BPH, HLD, HTN, nephrolithiasis who initially presented on 8/21 to [place] with chest pain and was admitted for an ischemic work up/ NSTEMI and was started on a heparin drip. His hospital course there was complicated by a vfib cardiac arrest while still in the ER and was found to have DIPTI in the anterior leads. No intervention was performed due to a poor mental status. He was admitted to MICU where his mental status subsequently improved and he was successfully extubated. HgbA1c 5.7% noted. Pt with nursing during assessment, multiple attempts made. Pt now extubated and tolerating po diet. RD to follow up with interview and nutrition education. spouse/lives with wife 2 children (25 yo and 14 yo)

## 2020-08-23 NOTE — DIETITIAN INITIAL EVALUATION ADULT. - RD TO REMAIN AVAILABLE
Pulse Duration (Include Units): 40 Pre-Procedure: Prior to proceeding the treatment areas were cleaned and all present put on their eye protection. Optic Size: 12 x 12mm Cooling: DCD setting Location Override: underarms  bakini legs front and back Laser Type: Alexandrite 755nm Post-Care Instructions: I reviewed with the patient in detail post-care instructions. Patient should avoid sun for a minimum of 4 weeks before and after treatment. Post-Procedure Care: Immediate endpoint: perifollicular erythema and edema. Post care was reviewed with the patient and all patient questions were answered to their satisfaction. Detail Level: Detailed Render Post-Care In The Note: No Fluence In J/Cm2: 11 External Cooling Fan Speed: 0 Rate (Hz): Fast Consent: Written consent obtained, risks reviewed including but not limited to crusting, scabbing, blistering, scarring, darker or lighter pigmentary change, paradoxical hair regrowth, incomplete removal of hair and infection. Price (Use Numbers Only, No Special Characters Or $): 00 yes

## 2020-08-23 NOTE — PROGRESS NOTE ADULT - ASSESSMENT
89 y/o with pmhx of BPH, HLD, HTN, nephrolithiasis admitted s/p cardiac arrest due to STEMI with hospital course complicated by hypoxic respiratory failure.    NEURO: no active issues  CVS: plan for cath on monday. hold ACE until after cath. continue statin, BB. keep K > 4, Mg > 2.   PULM: no active issues  GI: DASH diet  RENAL: no active issues  ID: monitor off abx, no signs of infection.  ENDO: no active issues goal 140-180. A1C stable.  HEME: on heparin gtt, ptt within goal. continue DAPT with asa/brilinta.  DISPO: full code.    stable for transfer to telemetry. sign out given to dr hoskins

## 2020-08-23 NOTE — PROGRESS NOTE ADULT - ATTENDING COMMENTS
-NSTEMI with occluded dLAD,  patient doing well s/p extubation and appropriate, TTE with ischemic CM EF 30-35%, plan for PCI of LCx and RCA on Monday  -monitor off Amiodarone, continue metoprolol, DAPT, statin and heparin gtt, trend Troponin.   -eventual addition of GDMT after PCI.           Josue Lala DO, [facility]  Faculty Non-Invasive Cardiologist  [phone]
Remains stable, Troponin downtrended, plan for PCI/stenting of RCA and LCx tomorrow, NPO after midnight.   -continue metoprolol, if recurrent ventricular ectopy to start amiodarone loading  -add GDMT after PCI        Josue Lala DO, [facility]  Faculty Non-Invasive Cardiologist

## 2020-08-23 NOTE — PROGRESS NOTE ADULT - SUBJECTIVE AND OBJECTIVE BOX
Patient is a 89y old  Male who presents with a chief complaint of Patient received from [facility] via [facility] as a STEMI. (22 Aug 2020 22:22)      BRIEF HOSPITAL COURSE: 88 y/o with pmhx of BPH, HLD, HTN, nephrolithiasis who initially presented on 8/21 to [place] with chest pain and was admitted for an ischemic work up/ NSTEMI and was started on a heparin drip. His hospital course there was complicated by a vfib cardiac arrest while still in the ER and was found to have DIPTI in the anterior leads. He was transferred to  for cardiac cath which revealed 100% occluded dLAD, 70% pCircumflex, 80% pRCA and an EF of 30%. No intervention was performed due to a poor mental status. He was admitted to MICU where his mental status subsequently improved and he was successfully extubated.     Events last 24 hours: amio drip weaned off. increased ventricular ectopy overnight, BB increased. Denies shortness of breath, abdominal pain. Has some chest pain with movement of his upper extremities and when coughing, likely related to cpr.     PAST MEDICAL & SURGICAL HISTORY:  Nephrolithiasis  BPH (benign prostatic hyperplasia)  HLD (hyperlipidemia)  HTN (hypertension)      Review of Systems:  CONSTITUTIONAL: No fever, chills, or fatigue  EYES: No eye pain, visual disturbances, or discharge  ENMT:  No difficulty hearing, tinnitus, vertigo; No sinus or throat pain  NECK: No pain or stiffness  RESPIRATORY: No cough, wheezing, chills or hemoptysis; No shortness of breath  CARDIOVASCULAR: No chest pain, palpitations, dizziness, or leg swelling  GASTROINTESTINAL: No abdominal or epigastric pain. No nausea, vomiting, or hematemesis; No diarrhea or constipation. No melena or hematochezia.  GENITOURINARY: No dysuria, frequency, hematuria, or incontinence  NEUROLOGICAL: No headaches, memory loss, loss of strength, numbness, or tremors  SKIN: No itching, burning, rashes, or lesions   MUSCULOSKELETAL: No joint pain or swelling; No muscle, back, or extremity pain  PSYCHIATRIC: No depression, anxiety, mood swings, or difficulty sleeping      Medications:    metoprolol tartrate 50 milliGRAM(s) Oral two times a day    hydrocodone/homatropine Syrup 5 milliLiter(s) Oral every 6 hours PRN    acetaminophen   Tablet .. 975 milliGRAM(s) Oral every 8 hours  HYDROmorphone  Injectable 0.5 milliGRAM(s) IV Push every 3 hours PRN      aspirin  chewable 81 milliGRAM(s) Oral daily  clopidogrel Tablet 75 milliGRAM(s) Oral daily  heparin   Injectable 4000 Unit(s) IV Push every 6 hours PRN  heparin  Infusion.  Unit(s)/Hr IV Continuous <Continuous>        atorvastatin 80 milliGRAM(s) Oral at bedtime  finasteride 5 milliGRAM(s) Oral daily  insulin lispro (HumaLOG) corrective regimen sliding scale   SubCutaneous three times a day before meals        chlorhexidine 4% Liquid 1 Application(s) Topical <User Schedule>            ICU Vital Signs Last 24 Hrs  T(C): 37 (23 Aug 2020 07:37), Max: 37.1 (22 Aug 2020 23:46)  T(F): 98.6 (23 Aug 2020 07:37), Max: 98.7 (22 Aug 2020 23:46)  HR: 70 (23 Aug 2020 07:00) (70 - 95)  BP: 94/51 (23 Aug 2020 07:00) (94/51 - 149/82)  BP(mean): 67 (23 Aug 2020 07:00) (67 - 110)  ABP: --  ABP(mean): --  RR: 18 (23 Aug 2020 07:00) (15 - 28)  SpO2: 97% (23 Aug 2020 07:00) (92% - 99%)      ABG - ( 21 Aug 2020 19:50 )  pH, Arterial: 7.30  pH, Blood: x     /  pCO2: 39    /  pO2: 100   / HCO3: 19    / Base Excess: -6.6  /  SaO2: 98                  I&O's Detail    22 Aug 2020 07:01  -  23 Aug 2020 07:00  --------------------------------------------------------  IN:    heparin  Infusion.: 144 mL    Oral Fluid: 240 mL  Total IN: 384 mL    OUT:    Incontinent per Condom Catheter: 300 mL    Indwelling Catheter - Urethral: 60 mL    Voided: 900 mL  Total OUT: 1260 mL    Total NET: -876 mL            LABS:                        13.8   13.81 )-----------( 153      ( 23 Aug 2020 06:51 )             42.6     08-23    136  |  100  |  16.0  ----------------------------<  116<H>  3.7   |  23.0  |  1.03    Ca    8.7      23 Aug 2020 06:51  Phos  2.4     08-23  Mg     2.0     08-23    TPro  6.5<L>  /  Alb  3.5  /  TBili  1.0  /  DBili  x   /  AST  48<H>  /  ALT  24  /  AlkPhos  74  08-23      CARDIAC MARKERS ( 22 Aug 2020 10:25 )  x     / 1.57 ng/mL / 829 U/L / x     / 63.0 ng/mL  CARDIAC MARKERS ( 22 Aug 2020 03:34 )  x     / 2.21 ng/mL / x     / x     / x      CARDIAC MARKERS ( 21 Aug 2020 19:47 )  x     / 0.82 ng/mL / x     / x     / x          CAPILLARY BLOOD GLUCOSE      POCT Blood Glucose.: 113 mg/dL (23 Aug 2020 05:59)    PT/INR - ( 22 Aug 2020 03:34 )   PT: 12.2 sec;   INR: 1.05 ratio         PTT - ( 23 Aug 2020 06:52 )  PTT:80.3 sec    CULTURES:      Physical Examination:    General: No acute distress.      HEENT: Pupils equal, reactive to light.  Symmetric.    PULM: Clear to auscultation bilaterally, no significant sputum production    NECK: Supple, no lymphadenopathy, trachea midline    CVS: Regular rate and rhythm, no murmurs, rubs, or gallops    ABD: Soft, nondistended, nontender, normoactive bowel sounds, no masses    EXT: No edema, nontender    SKIN: Warm and well perfused, no rashes noted.    NEURO: Alert, oriented, interactive, nonfocal    DEVICES: none    RADIOLOGY: reviewed

## 2020-08-23 NOTE — PROGRESS NOTE ADULT - SUBJECTIVE AND OBJECTIVE BOX
[facility]                                                               Office: [address] [address]                                                              Telephone: [phone]. Fax:[phone]                                                                             PROGRESS NOTE  Reason for follow up: NSTEMI, Vfib arrest  Overnight: 5 beats of NSVT  Update: Patient currently sitting out of bed to chair, A&O x 3. Patient states he has some chest pain, but only with movement after CPR. No other complaints.       Review of symptoms:   Cardiac:  + chest pain. No dyspnea. No palpitations.  Respiratory: No cough. No dyspnea  Gastrointestinal: No diarrhea. No abdominal pain. No bleeding.     Past medical history: No updates.   	  Vitals:  T(C): 37 (08-23-20 @ 07:37), Max: 37.1 (08-22-20 @ 23:46)  HR: 69 (08-23-20 @ 10:00) (69 - 95)  BP: 105/62 (08-23-20 @ 10:00) (94/51 - 149/82)  RR: 18 (08-23-20 @ 10:00) (15 - 28)  SpO2: 94% (08-23-20 @ 10:00) (92% - 99%)  Wt(kg): --  I&O's Summary    22 Aug 2020 07:01  -  23 Aug 2020 07:00  --------------------------------------------------------  IN: 384 mL / OUT: 1260 mL / NET: -876 mL      Weight (kg): 89.9 (08-21 @ 19:46)      PHYSICAL EXAM:  Appearance: Comfortable. No acute distress  HEENT:  Head and neck: Atraumatic. Normocephalic.  Normal oral mucosa, PERRL, Neck is supple. No JVD, No carotid bruit.   Neurologic: A&Ox 3, no focal deficits. EOMI, Cranial nerves are intact.  Lymphatic: No cervical lymphadenopathy  Cardiovascular: Normal S1 S2, No murmur, rubs/gallops. No JVD, + tenderness to anterior chest  Respiratory: Lungs clear to auscultation  Gastrointestinal:  Soft, Non-tender, + BS  Lower Extremities: No edema  Psychiatry: Patient is calm. No agitation. Mood & affect appropriate  Skin: No rashes/ecchymoses/cyanosis/ulcers visualized on the face, hands or feet.      CURRENT MEDICATIONS:  metoprolol tartrate 50 milliGRAM(s) Oral two times a day    acetaminophen   Tablet ..  atorvastatin  finasteride  insulin lispro (HumaLOG) corrective regimen sliding scale  aspirin  chewable  chlorhexidine 4% Liquid  clopidogrel Tablet  heparin  Infusion.      DIAGNOSTIC TESTING:  [ ] Echocardiogram:   < from: TTE Echo Complete w/ Contrast w/ Doppler (08.22.20 @ 08:46) >    PHYSICIAN INTERPRETATION:  Left Ventricle: Endocardial visualization was enhanced with intravenous echo contrast. The left ventricular internal cavity size is normal.  Left ventricular ejection fraction, by visual estimation, is 30 to 35%. Severely decreased segmental left ventricular systolic function. Spectral Doppler shows impaired relaxation pattern of left ventricular myocardial filling (Grade I diastolic dysfunction).      LV Wall Scoring:  The entire apex and mid and apical anterior septum are akinetic. The mid  inferoseptal segment, mid anterior segment, and mid inferior segment are  hypokinetic.    Right Ventricle: The right ventricle was not well visualized.  Left Atrium: The left atrium is normal in size.  Right Atrium: The right atrium was not well visualized.  Pericardium: There is no evidence of pericardial effusion. There is a significant pericardial fat pad present.  Mitral Valve: There is mild mitral annular calcification. Mild mitral valve regurgitation is seen.  Tricuspid Valve: The tricuspid valveis normal in structure. Trivial tricuspid regurgitation is visualized.  Aortic Valve: The aortic valve is trileaflet. Sclerotic aortic valve with normal opening. Peak transaortic gradient equals 2.1 mmHg, mean transaortic gradient equals 1.4 mmHg, the calculated aortic valve area equals 4.08 cm² by the continuity equation consistent with normally opening aortic valve. Mild aortic valve regurgitation is seen.  Pulmonic Valve: The pulmonic valve was not well visualized. Trace pulmonic valve regurgitation.  Aorta: The aortic root is normal in size and structure. The ascending aorta was not well visualized. The aortic arch was not well visualized.  Pulmonary Artery: The pulmonary artery is of normal size and origin.  Venous: The inferior vena cava is 1.7 cm. The inferior vena cava was normal sized, with respiratory size variation greater than 50%.  In comparison to the previous echocardiogram(s): There are no prior studies on this patient for comparison purposes. No prior study is available for comparison.      Summary:   1. Technically difficult study.   2. Endocardial visualization was enhanced with intravenous echo contrast. There is no evidence of LV thrombus.   3. Severely decreased segmental left ventricular systolic function.   4. Left ventricular ejection fraction, by visual estimation, is 30 to 35%.   5. LV Ejection Fraction by Delgadillo's Method with a biplane EF of 35 %.   6. There is akinesis of the apex, [id number] septum, distal anterior, distal inferior walls with mildhypokinesis of the mid-anterior and mid-inferior walls.     7. Spectral Doppler shows impaired relaxation pattern of left ventricular myocardial filling (Grade I diastolic dysfunction).   8. Normal RV size and function, estimated PASP least 31 mmHg.   9. Mild mitral annular calcification.  10. Mild mitral valve regurgitation.  11. Mild aortic regurgitation.  12. Sclerotic aortic valve with normal opening.  13. There is no evidence of pericardial effusion.  14. No prior study is available for comparison.    Josue Cervantes DO Electronically signed on 8/22/2020 at 4:30:40 PM    < end of copied text >    [ ]  Catheterization:  LM Distal 20-30%.   LAD- mid 70-80%, distal 100%.   circumflex- proximal 70%,   RCA- proximal 80%  LV function- severely depressed.        OTHER: 	      LABS:	 	  CARDIAC MARKERS ( 22 Aug 2020 10:25 )  x     / 1.57 ng/mL / 829 U/L / x     / 63.0 ng/mL  p-BNP 22 Aug 2020 10:25: x    , CARDIAC MARKERS ( 22 Aug 2020 03:34 )  x     / 2.21 ng/mL / x     / x     / x      p-BNP 22 Aug 2020 03:34: x    , CARDIAC MARKERS ( 21 Aug 2020 20:39 )  x     / x     / x     / x     / x      p-BNP 21 Aug 2020 20:39: 2525 pg/mL, CARDIAC MARKERS ( 21 Aug 2020 19:47 )  x     / 0.82 ng/mL / x     / x     / x      p-BNP 21 Aug 2020 19:47: x                              13.8   13.81 )-----------( 153      ( 23 Aug 2020 06:51 )             42.6     08-23    136  |  100  |  16.0  ----------------------------<  116<H>  3.7   |  23.0  |  1.03    Ca    8.7      23 Aug 2020 06:51  Phos  2.4     08-23  Mg     2.0     08-23    TPro  6.5<L>  /  Alb  3.5  /  TBili  1.0  /  DBili  x   /  AST  48<H>  /  ALT  24  /  AlkPhos  74  08-23    proBNP: Serum Pro-Brain Natriuretic Peptide: 2525 pg/mL (08-21 @ 20:39)    Lipid Profile: Date: 08-23 @ 06:51  Total cholesterol 110; Direct LDL: 46; HDL: 45; Triglycerides:93    HgA1c:   TSH:       TELEMETRY: Reviewed  SR, NSVT

## 2020-08-23 NOTE — DIETITIAN INITIAL EVALUATION ADULT. - PERTINENT LABORATORY DATA
08-23 Na136 mmol/L Glu 116 mg/dL<H> K+ 3.7 mmol/L Cr  1.03 mg/dL BUN 16.0 mg/dL Phos 2.4 mg/dL Alb 3.5 g/dL PAB n/a

## 2020-08-23 NOTE — PROGRESS NOTE ADULT - SUBJECTIVE AND OBJECTIVE BOX
Transfer / On service Note from ICU to telemetry to Hospitalist Service     CC : chest pain   chart reviewed , pt is seen examined   no distress , he is sitting in the chair , c/o mild chest discomfort with movements and left forearm skin redness   no SOB , he is hard of hearing     d/w ICU team , cardiology  follow up noted     Hospital Course   88 y/o with pmhx of BPH, HLD, HTN, nephrolithiasis who initially presented on  to [place] with chest pain and was admitted for an ischemic work up/ NSTEMI and was started on a heparin drip. His hospital course there was complicated by a vfib cardiac arrest while still in the ER and was found to have DIPTI in the anterior leads. He was transferred to  for cardiac cath which revealed 100% occluded dLAD, 70% pCircumflex, 80% pRCA and an EF of 30%. No intervention was performed due to a poor mental status. He was admitted to MICU where his mental status subsequently improved and he was successfully extubated.     Events last 24 hours per ICU : amio drip weaned off. increased ventricular ectopy overnight, BB increased. Denies shortness of breath, abdominal pain. Has some chest pain with movement of his upper extremities and when coughing, likely related to cpr.             ROS: as above, all remaining ROS are negative.       BACKGROUND:  MEDICATIONS  (STANDING):  acetaminophen   Tablet .. 975 milliGRAM(s) Oral every 8 hours  aspirin  chewable 81 milliGRAM(s) Oral daily  atorvastatin 80 milliGRAM(s) Oral at bedtime  chlorhexidine 4% Liquid 1 Application(s) Topical <User Schedule>  clopidogrel Tablet 75 milliGRAM(s) Oral daily  finasteride 5 milliGRAM(s) Oral daily  heparin  Infusion.  Unit(s)/Hr (10 mL/Hr) IV Continuous <Continuous>  insulin lispro (HumaLOG) corrective regimen sliding scale   SubCutaneous three times a day before meals  metoprolol tartrate 50 milliGRAM(s) Oral two times a day    MEDICATIONS  (PRN):  heparin   Injectable 4000 Unit(s) IV Push every 6 hours PRN For aPTT less than 40  hydrocodone/homatropine Syrup 5 milliLiter(s) Oral every 6 hours PRN pain/coughing  HYDROmorphone  Injectable 0.5 milliGRAM(s) IV Push every 3 hours PRN moderate to severe pain    Allergies    Allergy Status Unknown    Intolerances            VITALS:  Vital Signs Last 24 Hrs  T(C): 36.9 (23 Aug 2020 11:33), Max: 37.1 (22 Aug 2020 23:46)  T(F): 98.4 (23 Aug 2020 11:33), Max: 98.7 (22 Aug 2020 23:46)  HR: 87 (23 Aug 2020 12:00) (69 - 87)  BP: 133/70 (23 Aug 2020 12:00) (94/51 - 149/82)  BP(mean): 94 (23 Aug 2020 12:00) (67 - 110)  RR: 18 (23 Aug 2020 12:00) (15 - 26)  SpO2: 94% (23 Aug 2020 10:00) (92% - 99%) Daily     Daily Weight in k.3 (22 Aug 2020 23:46)  CAPILLARY BLOOD GLUCOSE      POCT Blood Glucose.: 124 mg/dL (23 Aug 2020 12:31)  POCT Blood Glucose.: 113 mg/dL (23 Aug 2020 05:59)  POCT Blood Glucose.: 103 mg/dL (22 Aug 2020 22:07)  POCT Blood Glucose.: 142 mg/dL (22 Aug 2020 17:21)    I&O's Summary    22 Aug 2020 07:  -  23 Aug 2020 07:00  --------------------------------------------------------  IN: 384 mL / OUT: 1545 mL / NET: -1161 mL    23 Aug 2020 07:01  -  23 Aug 2020 12:50  --------------------------------------------------------  IN: 0 mL / OUT: 200 mL / NET: -200 mL        PHYSICAL EXAM:      Constitutional: awake alert oriented     Neck: supple , no JVD       Respiratory: CTA bilateral     Cardiovascular: regular s1 /s2     Gastrointestinal: soft no tenderness . BS positive       Extremities: no pretibial edema     Neurological:    Skin: left forearm , above and below antecubital area skin red warm to touch , IV in place           LABS:                        13.8   13.81 )-----------( 153      ( 23 Aug 2020 06:51 )             42.6     08    136  |  100  |  16.0  ----------------------------<  116<H>  3.7   |  23.0  |  1.03    Ca    8.7      23 Aug 2020 06:51  Phos  2.4       Mg     2.0         TPro  6.5<L>  /  Alb  3.5  /  TBili  1.0  /  DBili  x   /  AST  48<H>  /  ALT  24  /  AlkPhos  74  08-23    PT/INR - ( 22 Aug 2020 03:34 )   PT: 12.2 sec;   INR: 1.05 ratio         PTT - ( 23 Aug 2020 06:52 )  PTT:80.3 sec    Radiology :

## 2020-08-24 LAB
ALBUMIN SERPL ELPH-MCNC: 3.3 G/DL — SIGNIFICANT CHANGE UP (ref 3.3–5.2)
ALP SERPL-CCNC: 71 U/L — SIGNIFICANT CHANGE UP (ref 40–120)
ALT FLD-CCNC: 19 U/L — SIGNIFICANT CHANGE UP
ANION GAP SERPL CALC-SCNC: 15 MMOL/L — SIGNIFICANT CHANGE UP (ref 5–17)
APTT BLD: 93.6 SEC — HIGH (ref 27.5–35.5)
AST SERPL-CCNC: 32 U/L — SIGNIFICANT CHANGE UP
BILIRUB SERPL-MCNC: 0.9 MG/DL — SIGNIFICANT CHANGE UP (ref 0.4–2)
BUN SERPL-MCNC: 16 MG/DL — SIGNIFICANT CHANGE UP (ref 8–20)
CALCIUM SERPL-MCNC: 8.9 MG/DL — SIGNIFICANT CHANGE UP (ref 8.6–10.2)
CHLORIDE SERPL-SCNC: 96 MMOL/L — LOW (ref 98–107)
CO2 SERPL-SCNC: 22 MMOL/L — SIGNIFICANT CHANGE UP (ref 22–29)
CREAT SERPL-MCNC: 1.04 MG/DL — SIGNIFICANT CHANGE UP (ref 0.5–1.3)
GLUCOSE BLDC GLUCOMTR-MCNC: 101 MG/DL — HIGH (ref 70–99)
GLUCOSE BLDC GLUCOMTR-MCNC: 103 MG/DL — HIGH (ref 70–99)
GLUCOSE BLDC GLUCOMTR-MCNC: 119 MG/DL — HIGH (ref 70–99)
GLUCOSE SERPL-MCNC: 181 MG/DL — HIGH (ref 70–99)
HCT VFR BLD CALC: 41.7 % — SIGNIFICANT CHANGE UP (ref 39–50)
HGB BLD-MCNC: 13.3 G/DL — SIGNIFICANT CHANGE UP (ref 13–17)
MCHC RBC-ENTMCNC: 30.4 PG — SIGNIFICANT CHANGE UP (ref 27–34)
MCHC RBC-ENTMCNC: 31.9 GM/DL — LOW (ref 32–36)
MCV RBC AUTO: 95.4 FL — SIGNIFICANT CHANGE UP (ref 80–100)
PLATELET # BLD AUTO: 155 K/UL — SIGNIFICANT CHANGE UP (ref 150–400)
POTASSIUM SERPL-MCNC: 3.9 MMOL/L — SIGNIFICANT CHANGE UP (ref 3.5–5.3)
POTASSIUM SERPL-SCNC: 3.9 MMOL/L — SIGNIFICANT CHANGE UP (ref 3.5–5.3)
PROT SERPL-MCNC: 6.8 G/DL — SIGNIFICANT CHANGE UP (ref 6.6–8.7)
RBC # BLD: 4.37 M/UL — SIGNIFICANT CHANGE UP (ref 4.2–5.8)
RBC # FLD: 13.6 % — SIGNIFICANT CHANGE UP (ref 10.3–14.5)
SODIUM SERPL-SCNC: 133 MMOL/L — LOW (ref 135–145)
WBC # BLD: 14.26 K/UL — HIGH (ref 3.8–10.5)
WBC # FLD AUTO: 14.26 K/UL — HIGH (ref 3.8–10.5)

## 2020-08-24 PROCEDURE — 99232 SBSQ HOSP IP/OBS MODERATE 35: CPT

## 2020-08-24 RX ORDER — LOSARTAN POTASSIUM 100 MG/1
25 TABLET, FILM COATED ORAL DAILY
Refills: 0 | Status: DISCONTINUED | OUTPATIENT
Start: 2020-08-24 | End: 2020-08-26

## 2020-08-24 RX ORDER — HEPARIN SODIUM 5000 [USP'U]/ML
5000 INJECTION INTRAVENOUS; SUBCUTANEOUS EVERY 12 HOURS
Refills: 0 | Status: DISCONTINUED | OUTPATIENT
Start: 2020-08-24 | End: 2020-08-26

## 2020-08-24 RX ORDER — SPIRONOLACTONE 25 MG/1
12.5 TABLET, FILM COATED ORAL DAILY
Refills: 0 | Status: DISCONTINUED | OUTPATIENT
Start: 2020-08-24 | End: 2020-08-26

## 2020-08-24 RX ORDER — METOPROLOL TARTRATE 50 MG
50 TABLET ORAL DAILY
Refills: 0 | Status: DISCONTINUED | OUTPATIENT
Start: 2020-08-24 | End: 2020-08-26

## 2020-08-24 RX ADMIN — Medication 975 MILLIGRAM(S): at 13:07

## 2020-08-24 RX ADMIN — CHLORHEXIDINE GLUCONATE 1 APPLICATION(S): 213 SOLUTION TOPICAL at 05:41

## 2020-08-24 RX ADMIN — Medication 500 MILLIGRAM(S): at 05:38

## 2020-08-24 RX ADMIN — FINASTERIDE 5 MILLIGRAM(S): 5 TABLET, FILM COATED ORAL at 13:10

## 2020-08-24 RX ADMIN — HEPARIN SODIUM 5000 UNIT(S): 5000 INJECTION INTRAVENOUS; SUBCUTANEOUS at 17:36

## 2020-08-24 RX ADMIN — Medication 975 MILLIGRAM(S): at 14:07

## 2020-08-24 RX ADMIN — HEPARIN SODIUM 1550 UNIT(S)/HR: 5000 INJECTION INTRAVENOUS; SUBCUTANEOUS at 00:36

## 2020-08-24 RX ADMIN — Medication 250 MILLIGRAM(S): at 17:36

## 2020-08-24 RX ADMIN — HYDROMORPHONE HYDROCHLORIDE 0.5 MILLIGRAM(S): 2 INJECTION INTRAMUSCULAR; INTRAVENOUS; SUBCUTANEOUS at 17:40

## 2020-08-24 RX ADMIN — Medication 975 MILLIGRAM(S): at 21:34

## 2020-08-24 RX ADMIN — Medication 975 MILLIGRAM(S): at 05:37

## 2020-08-24 RX ADMIN — Medication 81 MILLIGRAM(S): at 09:00

## 2020-08-24 RX ADMIN — HEPARIN SODIUM 4000 UNIT(S): 5000 INJECTION INTRAVENOUS; SUBCUTANEOUS at 00:37

## 2020-08-24 RX ADMIN — Medication 250 MILLIGRAM(S): at 05:38

## 2020-08-24 RX ADMIN — Medication 500 MILLIGRAM(S): at 17:36

## 2020-08-24 RX ADMIN — HEPARIN SODIUM 0 UNIT(S)/HR: 5000 INJECTION INTRAVENOUS; SUBCUTANEOUS at 06:43

## 2020-08-24 RX ADMIN — CLOPIDOGREL BISULFATE 75 MILLIGRAM(S): 75 TABLET, FILM COATED ORAL at 09:00

## 2020-08-24 RX ADMIN — Medication 50 MILLIGRAM(S): at 05:39

## 2020-08-24 RX ADMIN — Medication 975 MILLIGRAM(S): at 23:13

## 2020-08-24 RX ADMIN — ATORVASTATIN CALCIUM 80 MILLIGRAM(S): 80 TABLET, FILM COATED ORAL at 21:33

## 2020-08-24 NOTE — PROGRESS NOTE ADULT - SUBJECTIVE AND OBJECTIVE BOX
Reviewed patient chart, imaging, clinical scenerio.   Considering medical management of acute anterior MI, always concern for mechanical complication of MI.   Considering stable CAD, will plan for outpatient PCI to RCA and circumflex.   Will add aspirin, plavix, arb, spironolactone.   Follow up in office.

## 2020-08-24 NOTE — PROGRESS NOTE ADULT - SUBJECTIVE AND OBJECTIVE BOX
Interventional Cardiology NP Precath Note  Pt presents for Cardiac cath and intervention   Currently no chest pain but  is  " Sore and achy all over" s/p CPR         Mallampati 3  ASA 3  BRA 3.2%   GFR 63      PAST MEDICAL & SURGICAL HISTORY:  Nephrolithiasis  BPH (benign prostatic hyperplasia)  HLD (hyperlipidemia)  HTN (hypertension)         MEDS:   acetaminophen   Tablet .. 975 milliGRAM(s) Oral every 8 hours  aspirin  chewable 81 milliGRAM(s) Oral daily  atorvastatin 80 milliGRAM(s) Oral at bedtime  cephalexin 500 milliGRAM(s) Oral every 12 hours  chlorhexidine 4% Liquid 1 Application(s) Topical <User Schedule>  clopidogrel Tablet 75 milliGRAM(s) Oral daily  finasteride 5 milliGRAM(s) Oral daily  heparin   Injectable 4000 Unit(s) IV Push every 6 hours PRN  heparin  Infusion.  Unit(s)/Hr IV Continuous <Continuous>  hydrocodone/homatropine Syrup 5 milliLiter(s) Oral every 6 hours PRN  HYDROmorphone  Injectable 0.5 milliGRAM(s) IV Push every 3 hours PRN  insulin lispro (HumaLOG) corrective regimen sliding scale   SubCutaneous three times a day before meals  metoprolol tartrate 50 milliGRAM(s) Oral two times a day  saccharomyces boulardii 250 milliGRAM(s) Oral two times a day    Vitals   T(C): 36.7 (08-24-20 @ 10:55), Max: 37.7 (08-23-20 @ 22:39)  HR: 84 (08-24-20 @ 10:55) (84 - 94)  BP: 124/74 (08-24-20 @ 10:55) (108/65 - 133/76)  RR: 16 (08-24-20 @ 10:55) (16 - 20   SpO2: 96% (08-24-20 @ 10:55) (88% - 98%)    Exam   NEURO: A & O x 3, no focal neurologic deficits   HEENT: NCAT, EOMI, PERRLA  NECK: No JVD, No carotid bruits B/L, +2 Carotid pulses B/L  PULM:   diminished bilaerally   CARD: RRR, +S1, +S2, No M/R/G  ABD: ND, +BS, NT, no masses  EXT:  1+ PP   Right Groin ecchymosis with mild tenderness from groin to outer hip                           13.3   14.26 )-----------( 155      ( 24 Aug 2020 06:33 )             41.7     08-24    133<L>  |  96<L>  |  16.0  ----------------------------<  181<H>  3.9   |  22.0  |  1.04    Ca    8.9      24 Aug 2020 10:22  Phos  2.4     08-23  Mg     2.0     08-23    TPro  6.8  /  Alb  3.3  /  TBili  0.9  /  DBili  x   /  AST  32  /  ALT  19  /  AlkPhos  71  08-24      TTE Echo Complete w/ Contrast w/ Doppler (08.22.20   Summary:   1. Technically difficult study.   2. Endocardial visualization was enhanced with intravenous echo contrast. There is no evidence of LV thrombus.   3. Severely decreased segmental left ventricular systolic function.   4. Left ventricular ejection fraction, by visual estimation, is 30 to 35%.   5. LV Ejection Fraction by Delgadillo's Method with a biplane EF of 35 %.   6. There is akinesis of the apex, [id number] septum, distal anterior, distal inferior walls with mildhypokinesis of the mid-anterior and mid-inferior walls.     7. Spectral Doppler shows impaired relaxation pattern of left ventricular myocardial filling (Grade I diastolic dysfunction).   8. Normal RV size and function, estimated PASP least 31 mmHg.   9. Mild mitral annular calcification.  10. Mild mitral valve regurgitation.  11. Mild aortic regurgitation.  12. Sclerotic aortic valve with normal opening.  13. There is no evidence of pericardial effusion.  14. No prior study is available for comparison.          Cardiac Cath Lab - Adult (08.21.20    Global left ventricular function was severely depressed. EF 30 %.  CORONARY VESSELS: The coronary circulation is right dominant.  LM:   --  Distal left main: There was a 30 % stenosis.  LAD:   --  Proximal LAD: There was a 20 % stenosis.  --  Mid LAD: There was a diffuse 50 % stenosis. In a second lesion, there was a diffuse 80 % stenosis.  --  Distal LAD: There was a 100 % stenosis.  CX:   --  Proximal circumflex: There was a 70 % stenosis.  --  OM1: There was a 100 % stenosis.  --  OM2: There was a 50 % stenosis.  RCA:   --  Proximal RCA: There was a 80 % stenosis.  --  Mid RCA: There was a 20 % stenosis.  COMPLICATIONS: No complications occurred during the cath lab visit.  DIAGNOSTIC IMPRESSIONS: Severe multivessel disease. Distal LAD most likely  culprit. OM1 with minimal thrombus as well.  LAD- heavily calcified in the mid to distal portion. Severe LV dysfunction.  DIAGNOSTIC RECOMMENDATIONS: Considering heavily calcified LAD, unclear down  time, poor mental status, severe multivessel disease, plan for medicalmanagement at this time.  Will follow closely. Will have to consider multivessel PCI if neurologic recovery is noted. Interventional Cardiology NP Precath Note  Pt presents for Cardiac cath and intervention   Currently no chest pain but  is  " Sore and achy all over" s/p CPR   Reviewed case with Dr Florez  cardiac intervention cancelled       PAST MEDICAL & SURGICAL HISTORY:  Nephrolithiasis  BPH (benign prostatic hyperplasia)  HLD (hyperlipidemia)  HTN (hypertension)         MEDS:   acetaminophen   Tablet .. 975 milliGRAM(s) Oral every 8 hours  aspirin  chewable 81 milliGRAM(s) Oral daily  atorvastatin 80 milliGRAM(s) Oral at bedtime  cephalexin 500 milliGRAM(s) Oral every 12 hours  chlorhexidine 4% Liquid 1 Application(s) Topical <User Schedule>  clopidogrel Tablet 75 milliGRAM(s) Oral daily  finasteride 5 milliGRAM(s) Oral daily  heparin   Injectable 4000 Unit(s) IV Push every 6 hours PRN  heparin  Infusion.  Unit(s)/Hr IV Continuous <Continuous>  hydrocodone/homatropine Syrup 5 milliLiter(s) Oral every 6 hours PRN  HYDROmorphone  Injectable 0.5 milliGRAM(s) IV Push every 3 hours PRN  insulin lispro (HumaLOG) corrective regimen sliding scale   SubCutaneous three times a day before meals  metoprolol tartrate 50 milliGRAM(s) Oral two times a day  saccharomyces boulardii 250 milliGRAM(s) Oral two times a day    Vitals   T(C): 36.7 (08-24-20 @ 10:55), Max: 37.7 (08-23-20 @ 22:39)  HR: 84 (08-24-20 @ 10:55) (84 - 94)  BP: 124/74 (08-24-20 @ 10:55) (108/65 - 133/76)  RR: 16 (08-24-20 @ 10:55) (16 - 20   SpO2: 96% (08-24-20 @ 10:55) (88% - 98%)    Exam   NEURO: A & O x 3, no focal neurologic deficits   HEENT: NCAT, EOMI, PERRLA  NECK: No JVD, No carotid bruits B/L, +2 Carotid pulses B/L  PULM:   diminished bilaterally   CARD: RRR, +S1, +S2, No M/R/G  ABD: ND, +BS, NT, no masses  EXT:  1+ PP   Right Groin ecchymosis with mild tenderness from groin to outer hip                           13.3   14.26 )-----------( 155      ( 24 Aug 2020 06:33 )             41.7     08-24    133<L>  |  96<L>  |  16.0  ----------------------------<  181<H>  3.9   |  22.0  |  1.04    Ca    8.9      24 Aug 2020 10:22  Phos  2.4     08-23  Mg     2.0     08-23    TPro  6.8  /  Alb  3.3  /  TBili  0.9  /  DBili  x   /  AST  32  /  ALT  19  /  AlkPhos  71  08-24      TTE Echo Complete w/ Contrast w/ Doppler (08.22.20   Summary:   1. Technically difficult study.   2. Endocardial visualization was enhanced with intravenous echo contrast. There is no evidence of LV thrombus.   3. Severely decreased segmental left ventricular systolic function.   4. Left ventricular ejection fraction, by visual estimation, is 30 to 35%.   5. LV Ejection Fraction by Delgadillo's Method with a biplane EF of 35 %.   6. There is akinesis of the apex, [id number] septum, distal anterior, distal inferior walls with mildhypokinesis of the mid-anterior and mid-inferior walls.     7. Spectral Doppler shows impaired relaxation pattern of left ventricular myocardial filling (Grade I diastolic dysfunction).   8. Normal RV size and function, estimated PASP least 31 mmHg.   9. Mild mitral annular calcification.  10. Mild mitral valve regurgitation.  11. Mild aortic regurgitation.  12. Sclerotic aortic valve with normal opening.  13. There is no evidence of pericardial effusion.  14. No prior study is available for comparison.          Cardiac Cath Lab - Adult (08.21.20    Global left ventricular function was severely depressed. EF 30 %.  CORONARY VESSELS: The coronary circulation is right dominant.  LM:   --  Distal left main: There was a 30 % stenosis.  LAD:   --  Proximal LAD: There was a 20 % stenosis.  --  Mid LAD: There was a diffuse 50 % stenosis. In a second lesion, there was a diffuse 80 % stenosis.  --  Distal LAD: There was a 100 % stenosis.  CX:   --  Proximal circumflex: There was a 70 % stenosis.  --  OM1: There was a 100 % stenosis.  --  OM2: There was a 50 % stenosis.  RCA:   --  Proximal RCA: There was a 80 % stenosis.  --  Mid RCA: There was a 20 % stenosis.  COMPLICATIONS: No complications occurred during the cath lab visit.  DIAGNOSTIC IMPRESSIONS: Severe multivessel disease. Distal LAD most likely  culprit. OM1 with minimal thrombus as well.  LAD- heavily calcified in the mid to distal portion. Severe LV dysfunction.  DIAGNOSTIC RECOMMENDATIONS: Considering heavily calcified LAD, unclear down  time, poor mental status, severe multivessel disease, plan for medicalmanagement at this time.  Will follow closely. Will have to consider multivessel PCI if neurologic recovery is noted.

## 2020-08-24 NOTE — PROGRESS NOTE ADULT - ASSESSMENT
88 y/o with pmhx of BPH, HLD, HTN admitted s/p cardiac arrest due to STEMI  from [facility] ; before being transferred out of the ED patient went into Vfib requiring resuscitation and intubation.  Repeat EKG revealed changes in V1 through V4. STEMI called and patient was transferred to [facility]. A Cardiac Catheterization was done and patient was noted to have an occluded distal LAD, thought to be the culprit vessel.  No intervention was done at this time  pending patients neurological status    1- CAD with STEMI from [facility]   pt is s/p LHC no intervention   PCI defered see cardiology note   cont medical therapy , aspirin , ACE inh  , B blocker ,diuretics   cont to monitor inpatient     2- s/p VF arrest in [facility] s/p CPR   s/p  amio   stable at present   cont cardiac monitor and B blocker     3- Left arm phlebitis   cont warm compress   improving   started on empirical po keflex for severe erythema     4- Dm type 2 controlled BG   cont sliding scale insulin coverage   discussed with nurse and the patient     5- decreased GFR   cont to monitor cr is stable     Pt ambulate

## 2020-08-24 NOTE — PROGRESS NOTE ADULT - SUBJECTIVE AND OBJECTIVE BOX
Follow up for STEMI , VF arrest   pt is seen in am before he went to cath lab , he is with c/o chest discomfort with movements   sitting in the chair no distress , no SOB         Vital Signs Last 24 Hrs  T(C): 36.7 (24 Aug 2020 10:55), Max: 37.7 (23 Aug 2020 22:39)  T(F): 98.1 (24 Aug 2020 10:55), Max: 99.8 (23 Aug 2020 22:39)  HR: 84 (24 Aug 2020 10:55) (84 - 94)  BP: 124/74 (24 Aug 2020 10:55) (108/65 - 133/76)  BP(mean): --  RR: 16 (24 Aug 2020 10:55) (16 - 181)  SpO2: 96% (24 Aug 2020 10:55) (88% - 98%)          CAPILLARY BLOOD GLUCOSE      POCT Blood Glucose.: 101 mg/dL (24 Aug 2020 13:05)  POCT Blood Glucose.: 119 mg/dL (24 Aug 2020 08:06)  POCT Blood Glucose.: 109 mg/dL (23 Aug 2020 21:40)  POCT Blood Glucose.: 109 mg/dL (23 Aug 2020 16:28)  I&O's Summary          Constitutional: awake alert oriented     Respiratory: CTA bilateral , no wheezing diminished BS     Cardiovascular: regular s1 /s2     Gastrointestinal: soft no tenderness . BS positive     Extremities: no pretibial edema     Skin: left forearm redness swelling is improving                                13.3   14.26 )-----------( 155      ( 24 Aug 2020 06:33 )             41.7   08-24    133<L>  |  96<L>  |  16.0  ----------------------------<  181<H>  3.9   |  22.0  |  1.04    Ca    8.9      24 Aug 2020 10:22  Phos  2.4     08-23  Mg     2.0     08-23    TPro  6.8  /  Alb  3.3  /  TBili  0.9  /  DBili  x   /  AST  32  /  ALT  19  /  AlkPhos  71  08-24

## 2020-08-24 NOTE — PHYSICAL THERAPY INITIAL EVALUATION ADULT - ADDITIONAL COMMENTS
7+7 steps 1 rail to enter home, no stairs within home, states may own a RW from his wife but not sure.

## 2020-08-24 NOTE — PROGRESS NOTE ADULT - ASSESSMENT
Patient was a code STEMI, transferred from [facility] ED dept., patient presented with c/o Chest Pain and was being admitted for NSTEMI. A heparin drip was initiated.  Before being transferred out of the ED patient went into Vfib requiring resuscitation and intubation.  Repeat EKG revealed changes in V1 through V4.  STEMI called and patient was transferred to [facility]. A Cardiac Catheterization was done (8/21) which showed occluded distal LAD, multi- vessel disease   Intervention was deferred due to unknown mental status and instability . Pt currently extubated and conversing with full cognition of chantal , place and time   pt presented today for cardiac cath and intervention   Currently no no chest pain           PRE-PROCEDURE ASSESSMENT  LHC -Patient seen and examined  -Labs and EKG reviewed   -Pre-procedure teaching completed with patient and family  -  Informed consent obtained   -Questions answered  - Pt received Asprin/ Plavix given  prior to cardiac cath   Risks & benefits of procedure and sedation and risks and benefits for the alternative therapy have been explained to the patient in layman’s terms including but not limited to: allergic reaction, bleeding, infection, arrhythmia, respiratory compromise, renal and vascular compromise, limb damage, MI, CVA, emergent CABG/Vascular Surgery and death. Informed consent obtained and in chart. pt presented as a code STEMI, transferred from [facility] ED dept. He  presented with c/o Chest Pain and was being admitted for NSTEMI. A heparin drip was initiated.  Before being transferred out of the ED patient went into Vfib requiring resuscitation and intubation.  Repeat EKG revealed changes in V1 through V4.  STEMI called and patient was transferred to [facility]. A Cardiac Catheterization was done (8/21) which showed occluded distal LAD, multi- vessel disease 100% occluded dLAD, 70% pCircumflex, 80% pRCA and an EF of 30%  He was admitted to MICU where his mental status subsequently improved and he was successfully extubated, amio drip weaned off. increased ventricular ectopy overnight, BB increased.     Pt currently extubated and conversing with full cognition of person , place and time   He presented today for cardiac cath and intervention Currently no  chest pain    After review of case  Dr Florez determined best care for patient at this time is medical therapy till patient optimized         1- CAD with  multivessel disease  Medical therapy at this time   Discontinue heparin drip    Cont aspirin 81 mg po daily    Continue  Plavix 75 mg po daily   Continue Atorvastatin 80 mg po nightly   Start Losartan 25 mg po daily   Start spironolactone 12.5 mg po daily    Daily labs and EKG      2- VF arrest in [facility] s/ CPR   Discontinue metoprolol tartrate   Start long acting beta blocker  Toprol xl  50 mg po daily   cont cardiac monitor   Daily labs maintain Magnesium 2 and Potassium>= 4     3- Left arm phlebitis   cont warm compress ,  start empirical po keflex extensive skin swelling redness warm     4- Dm type 2 controlled BG   cont sliding scale insulin coverage      5 Plan of care reviewed with patient for medical therapy   Debilitation - PT evaluation

## 2020-08-24 NOTE — PHYSICAL THERAPY INITIAL EVALUATION ADULT - CRITERIA FOR SKILLED THERAPEUTIC INTERVENTIONS
functional limitations in following categories/rehab potential/anticipated discharge recommendation/therapy frequency/predicted duration of therapy intervention/impairments found

## 2020-08-25 ENCOUNTER — TRANSCRIPTION ENCOUNTER (OUTPATIENT)
Age: 85
End: 2020-08-25

## 2020-08-25 DIAGNOSIS — E78.49 OTHER HYPERLIPIDEMIA: ICD-10-CM

## 2020-08-25 DIAGNOSIS — I21.02 ST ELEVATION (STEMI) MYOCARDIAL INFARCTION INVOLVING LEFT ANTERIOR DESCENDING CORONARY ARTERY: ICD-10-CM

## 2020-08-25 DIAGNOSIS — I10 ESSENTIAL (PRIMARY) HYPERTENSION: ICD-10-CM

## 2020-08-25 PROBLEM — N20.0 CALCULUS OF KIDNEY: Chronic | Status: ACTIVE | Noted: 2020-08-21

## 2020-08-25 PROBLEM — N40.0 BENIGN PROSTATIC HYPERPLASIA WITHOUT LOWER URINARY TRACT SYMPTOMS: Chronic | Status: ACTIVE | Noted: 2020-08-21

## 2020-08-25 PROBLEM — E78.5 HYPERLIPIDEMIA, UNSPECIFIED: Chronic | Status: ACTIVE | Noted: 2020-08-21

## 2020-08-25 LAB
ANION GAP SERPL CALC-SCNC: 14 MMOL/L — SIGNIFICANT CHANGE UP (ref 5–17)
BUN SERPL-MCNC: 19 MG/DL — SIGNIFICANT CHANGE UP (ref 8–20)
CALCIUM SERPL-MCNC: 9 MG/DL — SIGNIFICANT CHANGE UP (ref 8.6–10.2)
CHLORIDE SERPL-SCNC: 100 MMOL/L — SIGNIFICANT CHANGE UP (ref 98–107)
CO2 SERPL-SCNC: 24 MMOL/L — SIGNIFICANT CHANGE UP (ref 22–29)
CREAT SERPL-MCNC: 0.99 MG/DL — SIGNIFICANT CHANGE UP (ref 0.5–1.3)
GLUCOSE BLDC GLUCOMTR-MCNC: 102 MG/DL — HIGH (ref 70–99)
GLUCOSE BLDC GLUCOMTR-MCNC: 107 MG/DL — HIGH (ref 70–99)
GLUCOSE BLDC GLUCOMTR-MCNC: 141 MG/DL — HIGH (ref 70–99)
GLUCOSE BLDC GLUCOMTR-MCNC: 144 MG/DL — HIGH (ref 70–99)
GLUCOSE SERPL-MCNC: 107 MG/DL — HIGH (ref 70–99)
HCT VFR BLD CALC: 39.8 % — SIGNIFICANT CHANGE UP (ref 39–50)
HGB BLD-MCNC: 12.8 G/DL — LOW (ref 13–17)
MAGNESIUM SERPL-MCNC: 2 MG/DL — SIGNIFICANT CHANGE UP (ref 1.6–2.6)
MCHC RBC-ENTMCNC: 31.1 PG — SIGNIFICANT CHANGE UP (ref 27–34)
MCHC RBC-ENTMCNC: 32.2 GM/DL — SIGNIFICANT CHANGE UP (ref 32–36)
MCV RBC AUTO: 96.6 FL — SIGNIFICANT CHANGE UP (ref 80–100)
PLATELET # BLD AUTO: 188 K/UL — SIGNIFICANT CHANGE UP (ref 150–400)
POTASSIUM SERPL-MCNC: 3.6 MMOL/L — SIGNIFICANT CHANGE UP (ref 3.5–5.3)
POTASSIUM SERPL-SCNC: 3.6 MMOL/L — SIGNIFICANT CHANGE UP (ref 3.5–5.3)
RBC # BLD: 4.12 M/UL — LOW (ref 4.2–5.8)
RBC # FLD: 13.8 % — SIGNIFICANT CHANGE UP (ref 10.3–14.5)
SODIUM SERPL-SCNC: 137 MMOL/L — SIGNIFICANT CHANGE UP (ref 135–145)
WBC # BLD: 12.43 K/UL — HIGH (ref 3.8–10.5)
WBC # FLD AUTO: 12.43 K/UL — HIGH (ref 3.8–10.5)

## 2020-08-25 PROCEDURE — 99232 SBSQ HOSP IP/OBS MODERATE 35: CPT

## 2020-08-25 RX ADMIN — Medication 81 MILLIGRAM(S): at 08:47

## 2020-08-25 RX ADMIN — FINASTERIDE 5 MILLIGRAM(S): 5 TABLET, FILM COATED ORAL at 08:47

## 2020-08-25 RX ADMIN — ATORVASTATIN CALCIUM 80 MILLIGRAM(S): 80 TABLET, FILM COATED ORAL at 21:07

## 2020-08-25 RX ADMIN — HEPARIN SODIUM 5000 UNIT(S): 5000 INJECTION INTRAVENOUS; SUBCUTANEOUS at 17:10

## 2020-08-25 RX ADMIN — Medication 500 MILLIGRAM(S): at 17:10

## 2020-08-25 RX ADMIN — Medication 500 MILLIGRAM(S): at 05:46

## 2020-08-25 RX ADMIN — Medication 250 MILLIGRAM(S): at 17:10

## 2020-08-25 RX ADMIN — Medication 975 MILLIGRAM(S): at 05:49

## 2020-08-25 RX ADMIN — CLOPIDOGREL BISULFATE 75 MILLIGRAM(S): 75 TABLET, FILM COATED ORAL at 08:47

## 2020-08-25 RX ADMIN — Medication 250 MILLIGRAM(S): at 05:44

## 2020-08-25 RX ADMIN — Medication 50 MILLIGRAM(S): at 05:49

## 2020-08-25 RX ADMIN — HEPARIN SODIUM 5000 UNIT(S): 5000 INJECTION INTRAVENOUS; SUBCUTANEOUS at 05:49

## 2020-08-25 RX ADMIN — SPIRONOLACTONE 12.5 MILLIGRAM(S): 25 TABLET, FILM COATED ORAL at 05:45

## 2020-08-25 RX ADMIN — LOSARTAN POTASSIUM 25 MILLIGRAM(S): 100 TABLET, FILM COATED ORAL at 05:46

## 2020-08-25 RX ADMIN — CHLORHEXIDINE GLUCONATE 1 APPLICATION(S): 213 SOLUTION TOPICAL at 08:46

## 2020-08-25 NOTE — DISCHARGE NOTE PROVIDER - NSDCFUSCHEDAPPT_GEN_ALL_CORE_FT
[facility] ; 09/10/2020 ; [facility] [address] [facility] ; 09/10/2020 ; [facility] [address] [facility] ; 09/10/2020 ; [facility] [address]

## 2020-08-25 NOTE — DISCHARGE NOTE PROVIDER - CARE PROVIDER_API CALL
Lloyd Florez  CARDIOVASCULAR DISEASE  [address], [address]  [place] [address]  Phone: [phone]  Fax: [phone]  Follow Up Time: 1 week

## 2020-08-25 NOTE — DISCHARGE NOTE PROVIDER - NSDCMRMEDTOKEN_GEN_ALL_CORE_FT
Aspir 81 oral delayed release tablet: 1 tab(s) orally once a day  finasteride 5 mg oral tablet: 1 tab(s) orally once a day  metoprolol succinate 50 mg oral tablet, extended release: 1 tab(s) orally once a day  Norvasc 10 mg oral tablet: 1 tab(s) orally once a day  omeprazole 20 mg oral delayed release capsule: 1 cap(s) orally once a day  pravastatin 40 mg oral tablet: 1 tab(s) orally once a day  tamsulosin 0.4 mg oral capsule: orally 2 times a day Aspir 81 oral delayed release tablet: 1 tab(s) orally once a day  bacitracin 500 units/g topical ointment: 1 application topically once a day    left upper arm with skin wound  clopidogrel 75 mg oral tablet: 1 tab(s) orally once a day  finasteride 5 mg oral tablet: 1 tab(s) orally once a day  losartan 25 mg oral tablet: 1 tab(s) orally once a day  metoprolol tartrate 50 mg oral tablet: 1.5 tab(s) orally 2 times a day  omeprazole 20 mg oral delayed release capsule: 1 cap(s) orally once a day  pravastatin 40 mg oral tablet: 1 tab(s) orally every other day  spironolactone 25 mg oral tablet: 0.5 tab(s) orally once a day  tamsulosin 0.4 mg oral capsule: 1 tab(s) orally once a day (at bedtime)

## 2020-08-25 NOTE — PROGRESS NOTE ADULT - SUBJECTIVE AND OBJECTIVE BOX
Follow up for STEMI , VF arrest on arrival s/p CPR   pt is sitting in the chair at the bedside   admits left sided chest discomfort in the back with movements only   no active CP , no SOB   cardiology input appreciated         Vital Signs Last 24 Hrs  T(C): 36.3 (25 Aug 2020 10:30), Max: 36.7 (24 Aug 2020 21:17)  T(F): 97.3 (25 Aug 2020 10:30), Max: 98.1 (24 Aug 2020 21:17)  HR: 93 (25 Aug 2020 10:30) (92 - 99)  BP: 123/76 (25 Aug 2020 10:30) (95/56 - 127/78)  BP(mean): --  RR: 18 (25 Aug 2020 10:30) (16 - 18)  SpO2: 98% (25 Aug 2020 05:40) (98% - 100%)          Constitutional: awake alert oriented, no distress      Respiratory: CTA bilateral , no wheezing , poor insp effort     Cardiovascular: regular s1 /s2     Gastrointestinal: soft no tenderness . BS positive     Extremities: no pretibial edema                              12.8   12.43 )-----------( 188      ( 25 Aug 2020 07:19 )             39.8   08-25    137  |  100  |  19.0  ----------------------------<  107<H>  3.6   |  24.0  |  0.99    Ca    9.0      25 Aug 2020 07:19  Mg     2.0     08-25    TPro  6.8  /  Alb  3.3  /  TBili  0.9  /  DBili  x   /  AST  32  /  ALT  19  /  AlkPhos  71  08-24  CAPILLARY BLOOD GLUCOSE      POCT Blood Glucose.: 107 mg/dL (25 Aug 2020 08:25)  POCT Blood Glucose.: 103 mg/dL (24 Aug 2020 17:02)  POCT Blood Glucose.: 101 mg/dL (24 Aug 2020 13:05)

## 2020-08-25 NOTE — PROGRESS NOTE ADULT - PROBLEM SELECTOR PLAN 1
On aspirin and plavix.   Beta blocker and statins.   CM- EF 35%. On losartan and spironolactone.   Considering hemodynamic stability, no evidence of failure, plan for dc planning tomorrow.   Follow up in office.   MEdical management of CAD at this time.

## 2020-08-25 NOTE — DISCHARGE NOTE PROVIDER - HOSPITAL COURSE
89 y/o with pmhx of BPH, HLD, HTN admitted s/p cardiac arrest due to STEMI  from [facility] ; before being transferred out of the ED patient went into Vfib requiring resuscitation and intubation.  Repeat EKG revealed changes in V1 through V4. STEMI called and patient was transferred to [facility]. A Cardiac Catheterization was done and patient was noted to have an occluded distal LAD, thought to be the culprit vessel.  No intervention was done at this time  pending patients neurological status, cont medical therapy and to follow up with cardiologist for further PCI intervention 88 y/o with pmhx of BPH, HLD, HTN admitted s/p cardiac arrest due to STEMI  from [facility] ; before being transferred out of the ED patient went into Vfib requiring resuscitation and intubation.  Repeat EKG revealed changes in V1 through V4. STEMI called and patient was transferred to [facility]. A Cardiac Catheterization was done and patient was noted to have an occluded distal LAD, thought to be the culprit vessel along with multivessel CAD.  No intervention was done at this time and subsequently cardiology recommended medical management and outpatient evaluation for repeat PCI.  patient with left upper arm skin tear with associated thrombophlebitis. duplex performed.        stable for discharge home.    dc planning 34 minutes.    vitals stable afebrile no acute complaints aside from left arm pain/swelling. ros negative    aaox3 nad rrr s1s2 ctab no chest TTP soft abdomen no LE edema left arm swelling with multiple ecchomyses (likely from iv sites). medial aspect of antecubital fossa with skin tear.    nonfocal neuro. ambulatory with walker.        updated son over the phone. 88 y/o with pmhx of BPH, HLD, HTN admitted s/p cardiac arrest due to STEMI  from [facility] ; before being transferred out of the ED patient went into Vfib requiring resuscitation and intubation.  Repeat EKG revealed changes in V1 through V4. STEMI called and patient was transferred to [facility]. A Cardiac Catheterization was done and patient was noted to have an occluded distal LAD, thought to be the culprit vessel along with multivessel CAD.  No intervention was done at this time and subsequently cardiology recommended medical management and outpatient evaluation for repeat PCI.  patient with left upper arm skin tear with associated thrombophlebitis. duplex performed.    on pravastatin at home due to myalgias.        stable for discharge home.    dc planning 34 minutes.    vitals stable afebrile no acute complaints aside from left arm pain/swelling. ros negative    aaox3 nad rrr s1s2 ctab no chest TTP soft abdomen no LE edema left arm swelling with multiple ecchomyses (likely from iv sites). medial aspect of antecubital fossa with skin tear.    nonfocal neuro. ambulatory with walker.        updated son over the phone.

## 2020-08-25 NOTE — DISCHARGE NOTE PROVIDER - NSDCCPCAREPLAN_GEN_ALL_CORE_FT
PRINCIPAL DISCHARGE DIAGNOSIS  Diagnosis: STEMI involving left anterior descending coronary artery  Assessment and Plan of Treatment: continue medical therapy , chasitywo upwith Dr Florez in 1-2week      SECONDARY DISCHARGE DIAGNOSES  Diagnosis: Ventricular fibrillation  Assessment and Plan of Treatment: ressusiated , stable    Diagnosis: Essential hypertension  Assessment and Plan of Treatment: Essential hypertension PRINCIPAL DISCHARGE DIAGNOSIS  Diagnosis: STEMI involving left anterior descending coronary artery  Assessment and Plan of Treatment: continue medical therapy , follow up with Dr Florez in 1-2weeks      SECONDARY DISCHARGE DIAGNOSES  Diagnosis: Essential hypertension  Assessment and Plan of Treatment: Essential hypertension    Diagnosis: Ventricular fibrillation  Assessment and Plan of Treatment: resolved    Diagnosis: Thrombophlebitis arm  Assessment and Plan of Treatment: local wound care

## 2020-08-25 NOTE — PROGRESS NOTE ADULT - SUBJECTIVE AND OBJECTIVE BOX
[place] [facility]                                                        Office: [address] [address]                                                       Telephone: [phone]. Fax: [phone]      CC: Chest wall pain.     INTERVAL HISTORY: patient with improved symptoms. Chest wall pain improving as well.     MEDICATIONS  (STANDING):  aspirin  chewable 81 milliGRAM(s) Oral daily  atorvastatin 80 milliGRAM(s) Oral at bedtime  cephalexin 500 milliGRAM(s) Oral every 12 hours  chlorhexidine 4% Liquid 1 Application(s) Topical <User Schedule>  clopidogrel Tablet 75 milliGRAM(s) Oral daily  finasteride 5 milliGRAM(s) Oral daily  heparin   Injectable 5000 Unit(s) SubCutaneous every 12 hours  insulin lispro (HumaLOG) corrective regimen sliding scale   SubCutaneous three times a day before meals  losartan 25 milliGRAM(s) Oral daily  metoprolol succinate ER 50 milliGRAM(s) Oral daily  saccharomyces boulardii 250 milliGRAM(s) Oral two times a day  spironolactone 12.5 milliGRAM(s) Oral daily    ROS: All others negative     PHYSICAL EXAM:  T(C): 36.8 (08-25-20 @ 15:30), Max: 36.8 (08-25-20 @ 15:30)  HR: 94 (08-25-20 @ 15:30) (92 - 99)  BP: 105/66 (08-25-20 @ 15:30) (105/66 - 127/78)  RR: 18 (08-25-20 @ 15:30) (16 - 18)  SpO2: 98% (08-25-20 @ 15:30) (98% - 100%)  Wt(kg): --  I&O's Summary    24 Aug 2020 07:01  -  25 Aug 2020 07:00  --------------------------------------------------------  IN: 373 mL / OUT: 975 mL / NET: -602 mL    25 Aug 2020 07:01  -  25 Aug 2020 18:25  --------------------------------------------------------  IN: 240 mL / OUT: 300 mL / NET: -60 mL      Appearance: Normal	  HEENT:   Normal oral mucosa, PERRL, EOMI	  Lymphatic: No lymphadenopathy  Cardiovascular: Normal S1 S2, No JVD, No murmurs, No edema  Respiratory: Lungs clear to auscultation	  Psychiatry: A & O x 3, Mood & affect appropriate  Gastrointestinal:  Soft, Non-tender, + BS	  Skin: No rashes, No ecchymoses, No cyanosis  Neurologic: Non-focal  Extremities: Normal range of motion, No clubbing, cyanosis or edema  Vascular: Peripheral pulses palpable 2+ bilaterally        LABS:	 	                        12.8   12.43 )-----------( 188      ( 25 Aug 2020 07:19 )             39.8     08-25    137  |  100  |  19.0  ----------------------------<  107<H>  3.6   |  24.0  |  0.99    Ca    9.0      25 Aug 2020 07:19  Mg     2.0     08-25    TPro  6.8  /  Alb  3.3  /  TBili  0.9  /  DBili  x   /  AST  32  /  ALT  19  /  AlkPhos  71  08-24

## 2020-08-25 NOTE — PROGRESS NOTE ADULT - REASON FOR ADMISSION
Patient received from [facility] via [facility] as a STEMI.
Patient received from [facility] via [employer] as a STEMI.
Patient received from [facility] as a STEMI.
Patient received from [facility] as a STEMI.
Patient received from [facility] as a STEMI.
Patient received from [facility] as a STEMI.
Patient received from [facility] as a STEMI.
Patient received from [facility] [employer] as a STEMI.
Patient received from [facility] via [employer] as a STEMI.
Patient received from [facility] via [employer] as a STEMI.
Patient received from [facility] via [employer] as a STEMI.

## 2020-08-25 NOTE — PROGRESS NOTE ADULT - ASSESSMENT
88 y/o with pmhx of BPH, HLD, HTN admitted s/p cardiac arrest due to STEMI  from [facility] ; before being transferred out of the ED patient went into Vfib requiring resuscitation and intubation.  Repeat EKG revealed changes in V1 through V4. STEMI called and patient was transferred to [facility]. A Cardiac Catheterization was done and patient was noted to have an occluded distal LAD, thought to be the culprit vessel.  No intervention was done at this time  pending patients neurological status, cont medical therapy and to follow up with cardiologist for further     1- CAD with STEMI from [facility]   pt is s/p LHC no intervention   PCI deferred  see cardiology note ;  will plan for outpatient PCI to RCA and circumflex.   cont medical therapy , aspirin , ACE inh  , B blocker ,diuretics   discharge home in 24 hours likely per cardio     2- s/p VF arrest in [facility] s/p CPR   s/p  amio   stable at present   cont cardiac monitor and B blocker     3- Left arm phlebitis , improving   cont warm compress   started on empirical po keflex for severe erythema     4- Dm type 2 controlled BG   cont sliding scale insulin coverage     5- decreased GFR   cont to monitor on diuretics    cr is stable     Pt ambulate

## 2020-08-26 ENCOUNTER — TRANSCRIPTION ENCOUNTER (OUTPATIENT)
Age: 85
End: 2020-08-26

## 2020-08-26 VITALS
HEART RATE: 85 BPM | TEMPERATURE: 98 F | DIASTOLIC BLOOD PRESSURE: 70 MMHG | OXYGEN SATURATION: 100 % | SYSTOLIC BLOOD PRESSURE: 137 MMHG

## 2020-08-26 DIAGNOSIS — I80.8 PHLEBITIS AND THROMBOPHLEBITIS OF OTHER SITES: ICD-10-CM

## 2020-08-26 LAB
GLUCOSE BLDC GLUCOMTR-MCNC: 116 MG/DL — HIGH (ref 70–99)
GLUCOSE BLDC GLUCOMTR-MCNC: 119 MG/DL — HIGH (ref 70–99)
HCT VFR BLD CALC: 36.5 % — LOW (ref 39–50)
HGB BLD-MCNC: 11.7 G/DL — LOW (ref 13–17)
MCHC RBC-ENTMCNC: 30.7 PG — SIGNIFICANT CHANGE UP (ref 27–34)
MCHC RBC-ENTMCNC: 32.1 GM/DL — SIGNIFICANT CHANGE UP (ref 32–36)
MCV RBC AUTO: 95.8 FL — SIGNIFICANT CHANGE UP (ref 80–100)
PLATELET # BLD AUTO: 203 K/UL — SIGNIFICANT CHANGE UP (ref 150–400)
RBC # BLD: 3.81 M/UL — LOW (ref 4.2–5.8)
RBC # FLD: 14 % — SIGNIFICANT CHANGE UP (ref 10.3–14.5)
WBC # BLD: 11.72 K/UL — HIGH (ref 3.8–10.5)
WBC # FLD AUTO: 11.72 K/UL — HIGH (ref 3.8–10.5)

## 2020-08-26 PROCEDURE — 82330 ASSAY OF CALCIUM: CPT

## 2020-08-26 PROCEDURE — 83880 ASSAY OF NATRIURETIC PEPTIDE: CPT

## 2020-08-26 PROCEDURE — 85014 HEMATOCRIT: CPT

## 2020-08-26 PROCEDURE — 86769 SARS-COV-2 COVID-19 ANTIBODY: CPT

## 2020-08-26 PROCEDURE — 36600 WITHDRAWAL OF ARTERIAL BLOOD: CPT

## 2020-08-26 PROCEDURE — 97163 PT EVAL HIGH COMPLEX 45 MIN: CPT

## 2020-08-26 PROCEDURE — 93005 ELECTROCARDIOGRAM TRACING: CPT

## 2020-08-26 PROCEDURE — 83605 ASSAY OF LACTIC ACID: CPT

## 2020-08-26 PROCEDURE — 86850 RBC ANTIBODY SCREEN: CPT

## 2020-08-26 PROCEDURE — 97116 GAIT TRAINING THERAPY: CPT

## 2020-08-26 PROCEDURE — 83735 ASSAY OF MAGNESIUM: CPT

## 2020-08-26 PROCEDURE — 80053 COMPREHEN METABOLIC PANEL: CPT

## 2020-08-26 PROCEDURE — C1894: CPT

## 2020-08-26 PROCEDURE — 93458 L HRT ARTERY/VENTRICLE ANGIO: CPT

## 2020-08-26 PROCEDURE — 93971 EXTREMITY STUDY: CPT | Mod: 26,LT

## 2020-08-26 PROCEDURE — 82550 ASSAY OF CK (CPK): CPT

## 2020-08-26 PROCEDURE — C1769: CPT

## 2020-08-26 PROCEDURE — 84100 ASSAY OF PHOSPHORUS: CPT

## 2020-08-26 PROCEDURE — 80061 LIPID PANEL: CPT

## 2020-08-26 PROCEDURE — 84132 ASSAY OF SERUM POTASSIUM: CPT

## 2020-08-26 PROCEDURE — C1760: CPT

## 2020-08-26 PROCEDURE — 82803 BLOOD GASES ANY COMBINATION: CPT

## 2020-08-26 PROCEDURE — 84484 ASSAY OF TROPONIN QUANT: CPT

## 2020-08-26 PROCEDURE — C8929: CPT

## 2020-08-26 PROCEDURE — 82962 GLUCOSE BLOOD TEST: CPT

## 2020-08-26 PROCEDURE — 71045 X-RAY EXAM CHEST 1 VIEW: CPT

## 2020-08-26 PROCEDURE — 82947 ASSAY GLUCOSE BLOOD QUANT: CPT

## 2020-08-26 PROCEDURE — 36415 COLL VENOUS BLD VENIPUNCTURE: CPT

## 2020-08-26 PROCEDURE — 85730 THROMBOPLASTIN TIME PARTIAL: CPT

## 2020-08-26 PROCEDURE — 85610 PROTHROMBIN TIME: CPT

## 2020-08-26 PROCEDURE — 94002 VENT MGMT INPAT INIT DAY: CPT

## 2020-08-26 PROCEDURE — 82553 CREATINE MB FRACTION: CPT

## 2020-08-26 PROCEDURE — C1887: CPT

## 2020-08-26 PROCEDURE — 83036 HEMOGLOBIN GLYCOSYLATED A1C: CPT

## 2020-08-26 PROCEDURE — 86900 BLOOD TYPING SEROLOGIC ABO: CPT

## 2020-08-26 PROCEDURE — 84295 ASSAY OF SERUM SODIUM: CPT

## 2020-08-26 PROCEDURE — 99239 HOSP IP/OBS DSCHRG MGMT >30: CPT

## 2020-08-26 PROCEDURE — 82435 ASSAY OF BLOOD CHLORIDE: CPT

## 2020-08-26 PROCEDURE — 80048 BASIC METABOLIC PNL TOTAL CA: CPT

## 2020-08-26 PROCEDURE — 85027 COMPLETE CBC AUTOMATED: CPT

## 2020-08-26 PROCEDURE — 97110 THERAPEUTIC EXERCISES: CPT

## 2020-08-26 PROCEDURE — 86901 BLOOD TYPING SEROLOGIC RH(D): CPT

## 2020-08-26 PROCEDURE — 93971 EXTREMITY STUDY: CPT

## 2020-08-26 RX ORDER — SPIRONOLACTONE 25 MG/1
0.5 TABLET, FILM COATED ORAL
Qty: 15 | Refills: 0
Start: 2020-08-26 | End: 2020-09-24

## 2020-08-26 RX ORDER — LOSARTAN POTASSIUM 100 MG/1
1 TABLET, FILM COATED ORAL
Qty: 30 | Refills: 0
Start: 2020-08-26 | End: 2020-09-24

## 2020-08-26 RX ORDER — TAMSULOSIN HYDROCHLORIDE 0.4 MG/1
1 CAPSULE ORAL
Qty: 0 | Refills: 0 | DISCHARGE

## 2020-08-26 RX ORDER — AMLODIPINE BESYLATE 2.5 MG/1
1 TABLET ORAL
Qty: 0 | Refills: 0 | DISCHARGE

## 2020-08-26 RX ORDER — CLOPIDOGREL BISULFATE 75 MG/1
1 TABLET, FILM COATED ORAL
Qty: 30 | Refills: 0
Start: 2020-08-26 | End: 2020-09-24

## 2020-08-26 RX ORDER — BACITRACIN ZINC 500 UNIT/G
1 OINTMENT IN PACKET (EA) TOPICAL
Qty: 100 | Refills: 0
Start: 2020-08-26 | End: 2020-09-04

## 2020-08-26 RX ORDER — METOPROLOL TARTRATE 50 MG
1 TABLET ORAL
Qty: 0 | Refills: 0 | DISCHARGE

## 2020-08-26 RX ORDER — TAMSULOSIN HYDROCHLORIDE 0.4 MG/1
0 CAPSULE ORAL
Qty: 0 | Refills: 0 | DISCHARGE

## 2020-08-26 RX ORDER — METOPROLOL TARTRATE 50 MG
1.5 TABLET ORAL
Qty: 0 | Refills: 0 | DISCHARGE

## 2020-08-26 RX ORDER — BACITRACIN ZINC 500 UNIT/G
1 OINTMENT IN PACKET (EA) TOPICAL DAILY
Refills: 0 | Status: DISCONTINUED | OUTPATIENT
Start: 2020-08-26 | End: 2020-08-26

## 2020-08-26 RX ADMIN — SPIRONOLACTONE 12.5 MILLIGRAM(S): 25 TABLET, FILM COATED ORAL at 05:08

## 2020-08-26 RX ADMIN — Medication 50 MILLIGRAM(S): at 05:08

## 2020-08-26 RX ADMIN — Medication 500 MILLIGRAM(S): at 16:47

## 2020-08-26 RX ADMIN — Medication 500 MILLIGRAM(S): at 05:08

## 2020-08-26 RX ADMIN — HEPARIN SODIUM 5000 UNIT(S): 5000 INJECTION INTRAVENOUS; SUBCUTANEOUS at 05:07

## 2020-08-26 RX ADMIN — LOSARTAN POTASSIUM 25 MILLIGRAM(S): 100 TABLET, FILM COATED ORAL at 05:08

## 2020-08-26 RX ADMIN — Medication 250 MILLIGRAM(S): at 05:08

## 2020-08-26 RX ADMIN — Medication 250 MILLIGRAM(S): at 16:48

## 2020-08-26 RX ADMIN — CLOPIDOGREL BISULFATE 75 MILLIGRAM(S): 75 TABLET, FILM COATED ORAL at 12:22

## 2020-08-26 RX ADMIN — CHLORHEXIDINE GLUCONATE 1 APPLICATION(S): 213 SOLUTION TOPICAL at 08:06

## 2020-08-26 RX ADMIN — Medication 81 MILLIGRAM(S): at 12:22

## 2020-08-26 RX ADMIN — FINASTERIDE 5 MILLIGRAM(S): 5 TABLET, FILM COATED ORAL at 12:22

## 2020-08-26 NOTE — DISCHARGE NOTE NURSING/CASE MANAGEMENT/SOCIAL WORK - PATIENT PORTAL LINK FT
You can access the FollowMyHealth Patient Portal offered by [facility] by registering at the following website: [email or web address]. By joining [email or web address]’s FollowMyHealth portal, you will also be able to view your health information using other applications (apps) compatible with our system.

## 2020-09-10 ENCOUNTER — NON-APPOINTMENT (OUTPATIENT)
Age: 85
End: 2020-09-10

## 2020-09-10 ENCOUNTER — APPOINTMENT (OUTPATIENT)
Dept: CARDIOLOGY | Facility: CLINIC | Age: 85
End: 2020-09-10
Payer: MEDICARE

## 2020-09-10 VITALS
HEIGHT: 67 IN | SYSTOLIC BLOOD PRESSURE: 99 MMHG | WEIGHT: 176 LBS | TEMPERATURE: 97.1 F | DIASTOLIC BLOOD PRESSURE: 57 MMHG | BODY MASS INDEX: 27.62 KG/M2 | OXYGEN SATURATION: 96 % | HEART RATE: 80 BPM

## 2020-09-10 VITALS — SYSTOLIC BLOOD PRESSURE: 100 MMHG | DIASTOLIC BLOOD PRESSURE: 42 MMHG

## 2020-09-10 VITALS — SYSTOLIC BLOOD PRESSURE: 100 MMHG | DIASTOLIC BLOOD PRESSURE: 50 MMHG

## 2020-09-10 DIAGNOSIS — Z82.49 FAMILY HISTORY OF ISCHEMIC HEART DISEASE AND OTHER DISEASES OF THE CIRCULATORY SYSTEM: ICD-10-CM

## 2020-09-10 DIAGNOSIS — I21.3 ST ELEVATION (STEMI) MYOCARDIAL INFARCTION OF UNSPECIFIED SITE: ICD-10-CM

## 2020-09-10 DIAGNOSIS — I80.8 PHLEBITIS AND THROMBOPHLEBITIS OF OTHER SITES: ICD-10-CM

## 2020-09-10 DIAGNOSIS — Z63.4 DISAPPEARANCE AND DEATH OF FAMILY MEMBER: ICD-10-CM

## 2020-09-10 DIAGNOSIS — K21.9 GASTRO-ESOPHAGEAL REFLUX DISEASE W/OUT ESOPHAGITIS: ICD-10-CM

## 2020-09-10 PROCEDURE — 99214 OFFICE O/P EST MOD 30 MIN: CPT

## 2020-09-10 PROCEDURE — 93000 ELECTROCARDIOGRAM COMPLETE: CPT

## 2020-09-10 SDOH — SOCIAL STABILITY - SOCIAL INSECURITY: DISSAPEARANCE AND DEATH OF FAMILY MEMBER: Z63.4

## 2020-09-21 RX ORDER — SPIRONOLACTONE 25 MG/1
25 TABLET ORAL DAILY
Qty: 45 | Refills: 1 | Status: ACTIVE | COMMUNITY
Start: 2020-09-10 | End: 1900-01-01

## 2020-09-22 NOTE — HISTORY OF PRESENT ILLNESS
[FreeTextEntry1] : Patient presented to [facility] after cardiac arrest at [place] in 9/2020. Had CP at home and in hospital. Small NSTEMI. During hospital stay, had a cardiac arrest. Noted to have significant CAD.  LAD most likely etiology but diffuse thrombotic disease. No intervention performed due to diffuse nature of disease, patient's unknown neurologic status. Treated medically, extubated and did well. \par EF at discharge 30-35%. \par \par 9/2020- Stable. No chest pain. No shortness of breath. \par \par

## 2020-09-22 NOTE — DISCUSSION/SUMMARY
[FreeTextEntry1] : 1. CAD: on aspirin and plavix. \par 2. CM: On losartan and beta blocker and low dose spironolactone.  Borderline blood pressure. Will be limited in increasing medications. \par TTE to assess LV function. \par Follow up in office in 4-6 weeks.

## 2020-09-22 NOTE — PHYSICAL EXAM
[General Appearance - Well Developed] : well developed [Normal Conjunctiva] : the conjunctiva exhibited no abnormalities [Normal Oral Mucosa] : normal oral mucosa [Normal Jugular Venous V Waves Present] : normal jugular venous V waves present [Heart Rate And Rhythm] : heart rate and rhythm were normal [] : no respiratory distress [Respiration, Rhythm And Depth] : normal respiratory rhythm and effort [Bowel Sounds] : normal bowel sounds [Abdomen Soft] : soft [Nail Clubbing] : no clubbing of the fingernails [Skin Color & Pigmentation] : normal skin color and pigmentation [Oriented To Time, Place, And Person] : oriented to person, place, and time

## 2020-10-08 ENCOUNTER — APPOINTMENT (OUTPATIENT)
Dept: CARDIOLOGY | Facility: CLINIC | Age: 85
End: 2020-10-08
Payer: MEDICARE

## 2020-10-08 PROCEDURE — 93306 TTE W/DOPPLER COMPLETE: CPT

## 2020-10-27 ENCOUNTER — NON-APPOINTMENT (OUTPATIENT)
Age: 85
End: 2020-10-27

## 2020-10-27 ENCOUNTER — APPOINTMENT (OUTPATIENT)
Dept: CARDIOLOGY | Facility: CLINIC | Age: 85
End: 2020-10-27
Payer: MEDICARE

## 2020-10-27 VITALS
OXYGEN SATURATION: 96 % | SYSTOLIC BLOOD PRESSURE: 147 MMHG | HEART RATE: 80 BPM | WEIGHT: 179 LBS | DIASTOLIC BLOOD PRESSURE: 70 MMHG | BODY MASS INDEX: 28.04 KG/M2 | TEMPERATURE: 97.4 F

## 2020-10-27 DIAGNOSIS — E78.5 HYPERLIPIDEMIA, UNSPECIFIED: ICD-10-CM

## 2020-10-27 DIAGNOSIS — I25.10 ATHEROSCLEROTIC HEART DISEASE OF NATIVE CORONARY ARTERY W/OUT ANGINA PECTORIS: ICD-10-CM

## 2020-10-27 DIAGNOSIS — I23.6 THROMBOSIS OF ATRIUM, AURICULAR APPENDAGE, AND VENTRICLE AS CURRENT COMPLICATIONS FOLLOWING ACUTE MYOCARDIAL INFARCTION: ICD-10-CM

## 2020-10-27 PROCEDURE — 99072 ADDL SUPL MATRL&STAF TM PHE: CPT

## 2020-10-27 PROCEDURE — 99214 OFFICE O/P EST MOD 30 MIN: CPT

## 2020-10-27 PROCEDURE — 93000 ELECTROCARDIOGRAM COMPLETE: CPT

## 2020-10-27 RX ORDER — CLOPIDOGREL BISULFATE 75 MG/1
75 TABLET, FILM COATED ORAL
Qty: 90 | Refills: 1 | Status: DISCONTINUED | COMMUNITY
Start: 2020-09-10 | End: 2020-10-27

## 2020-11-25 ENCOUNTER — NON-APPOINTMENT (OUTPATIENT)
Age: 85
End: 2020-11-25

## 2020-11-30 ENCOUNTER — NON-APPOINTMENT (OUTPATIENT)
Age: 85
End: 2020-11-30

## 2020-12-07 ENCOUNTER — NON-APPOINTMENT (OUTPATIENT)
Age: 85
End: 2020-12-07

## 2021-01-14 ENCOUNTER — APPOINTMENT (OUTPATIENT)
Dept: CARDIOLOGY | Facility: CLINIC | Age: 86
End: 2021-01-14

## 2021-01-25 ENCOUNTER — APPOINTMENT (OUTPATIENT)
Dept: CARDIOLOGY | Facility: CLINIC | Age: 86
End: 2021-01-25

## 2021-08-23 ENCOUNTER — NON-APPOINTMENT (OUTPATIENT)
Age: 86
End: 2021-08-23

## 2021-08-27 ENCOUNTER — APPOINTMENT (OUTPATIENT)
Dept: UROLOGY | Facility: CLINIC | Age: 86
End: 2021-08-27
Payer: MEDICARE

## 2021-08-27 VITALS
WEIGHT: 179 LBS | SYSTOLIC BLOOD PRESSURE: 117 MMHG | HEIGHT: 67 IN | HEART RATE: 71 BPM | DIASTOLIC BLOOD PRESSURE: 68 MMHG | BODY MASS INDEX: 28.09 KG/M2

## 2021-08-27 PROCEDURE — 99213 OFFICE O/P EST LOW 20 MIN: CPT

## 2021-08-27 NOTE — HISTORY OF PRESENT ILLNESS
[FreeTextEntry1] : He is an 89 year-old man who is seen today in follow-up for urinary symptoms, kidney stones with his son.  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  There is no significant change in his urinary symptoms.  He believes that urine collection in his scrotum after he voids but he seems to be describing post void dribbling.  He is on tamsulosin twice a day and finasteride.  Residual urine today was 110 cc.  Nocturia is once or twice.  He has a large kidney stone that does not bother him but has continued to observe given his age.\par \par Previous notes: previously, cystoscopy showed bleeding from prostate and also small sand-like stones in the bladder. CT showed a 1.7 cm left kidney stone and 3 cm AAA. Urine culture and cytology were negative. He underwent shockwave lithotripsy about 30 years ago for kidney stone.

## 2021-08-27 NOTE — PHYSICAL EXAM
[General Appearance - Well Developed] : well developed [General Appearance - Well Nourished] : well nourished [Normal Appearance] : normal appearance [Well Groomed] : well groomed [General Appearance - In No Acute Distress] : no acute distress [Abdomen Soft] : soft [Abdomen Tenderness] : non-tender [Costovertebral Angle Tenderness] : no ~M costovertebral angle tenderness [Urethral Meatus] : meatus normal [Penis Abnormality] : normal uncircumcised penis [Urinary Bladder Findings] : the bladder was normal on palpation [Scrotum] : the scrotum was normal [Testes Mass (___cm)] : there were no testicular masses [FreeTextEntry1] : No scrotal edema [] : no respiratory distress [Respiration, Rhythm And Depth] : normal respiratory rhythm and effort [Exaggerated Use Of Accessory Muscles For Inspiration] : no accessory muscle use [Oriented To Time, Place, And Person] : oriented to person, place, and time [Affect] : the affect was normal [Mood] : the mood was normal [Not Anxious] : not anxious

## 2021-08-27 NOTE — LETTER BODY
[Dear  ___] : Dear  [unfilled], [Consult Letter:] : I had the pleasure of evaluating your patient, [unfilled]. [Consult Closing:] : Thank you very much for allowing me to participate in the care of this patient.  If you have any questions, please do not hesitate to contact me. [FreeTextEntry1] : Address:  [address], [place] [address]\par Phone: [phone]

## 2021-08-27 NOTE — ASSESSMENT
[FreeTextEntry1] : No obvious scrotal edema is noted at this time.  Urinary symptoms are relatively unchanged.  We discussed how to improve post void dribbling.  His medications were refilled.  Residual urine volume is relatively stable.  He is not symptomatic with his large kidney stone which has been there for several years and he has continued to monitor.  He can follow-up in 1 year.\par \par Thang Gabriel MD, FACS\par [facility]\par  of Urology\par \par [address], [address]\[place] [address]\par \par [address], [address]\[place] [address]\par \par Tel: [phone]\par Fax: [phone]

## 2021-11-09 ENCOUNTER — NON-APPOINTMENT (OUTPATIENT)
Age: 86
End: 2021-11-09

## 2022-03-15 NOTE — PATIENT PROFILE ADULT - ABILITY TO HEAR (WITH HEARING AID OR HEARING APPLIANCE IF NORMALLY USED):
This encounter was created in error - please disregard.   Mildly to Moderately Impaired: difficulty hearing in some environments or speaker may need to increase volume or speak distinctly

## 2022-10-14 ENCOUNTER — APPOINTMENT (OUTPATIENT)
Dept: UROLOGY | Facility: CLINIC | Age: 87
End: 2022-10-14

## 2022-10-14 VITALS — SYSTOLIC BLOOD PRESSURE: 125 MMHG | DIASTOLIC BLOOD PRESSURE: 65 MMHG | OXYGEN SATURATION: 95 % | HEART RATE: 60 BPM

## 2022-10-14 PROCEDURE — 99213 OFFICE O/P EST LOW 20 MIN: CPT

## 2022-10-14 RX ORDER — APIXABAN 5 MG/1
5 TABLET, FILM COATED ORAL
Qty: 180 | Refills: 1 | Status: DISCONTINUED | COMMUNITY
Start: 2020-10-08 | End: 2022-10-14

## 2022-10-14 RX ORDER — ROSUVASTATIN CALCIUM 40 MG/1
40 TABLET, FILM COATED ORAL
Refills: 0 | Status: ACTIVE | COMMUNITY

## 2022-10-14 RX ORDER — METOPROLOL TARTRATE 50 MG/1
50 TABLET, FILM COATED ORAL
Qty: 180 | Refills: 1 | Status: DISCONTINUED | COMMUNITY
Start: 2020-09-10 | End: 2022-10-14

## 2022-10-14 RX ORDER — LOSARTAN POTASSIUM 25 MG/1
25 TABLET, FILM COATED ORAL DAILY
Qty: 90 | Refills: 1 | Status: DISCONTINUED | COMMUNITY
Start: 2020-09-10 | End: 2022-10-14

## 2022-10-14 RX ORDER — NITROGLYCERIN 0.4 MG/1
0.4 TABLET SUBLINGUAL
Refills: 0 | Status: ACTIVE | COMMUNITY

## 2022-10-14 RX ORDER — DOCUSATE SODIUM 100 MG/1
100 CAPSULE ORAL DAILY
Refills: 0 | Status: DISCONTINUED | COMMUNITY
Start: 2020-09-10 | End: 2022-10-14

## 2022-10-14 RX ORDER — OMEPRAZOLE 20 MG/1
20 CAPSULE, DELAYED RELEASE ORAL
Qty: 30 | Refills: 2 | Status: DISCONTINUED | COMMUNITY
Start: 2020-09-10 | End: 2022-10-14

## 2022-10-14 RX ORDER — CARVEDILOL 12.5 MG/1
12.5 TABLET, FILM COATED ORAL
Refills: 0 | Status: ACTIVE | COMMUNITY

## 2022-10-14 RX ORDER — WARFARIN 4 MG/1
4 TABLET ORAL
Refills: 0 | Status: ACTIVE | COMMUNITY

## 2022-10-14 RX ORDER — ISOSORBIDE DINITRATE 30 MG/1
30 TABLET ORAL
Refills: 0 | Status: ACTIVE | COMMUNITY

## 2022-10-14 NOTE — HISTORY OF PRESENT ILLNESS
[FreeTextEntry1] : He is an 89 year-old man who is seen today in follow-up for urinary symptoms and kidney stones with his son.  Nocturia is twice.  He has postvoid dribbling.  Residual urine volume today was under 30 mL.  He is on tamsulosin twice a day and finasteride.  He is not bothered by his longstanding kidney stone.  He has history of myocardial infarction and a stroke which affected his left thigh vision.\par \par Previous notes: previously, cystoscopy showed bleeding from prostate and also small sand-like stones in the bladder. CT showed a 1.7 cm left kidney stone and 3 cm AAA. Urine culture and cytology were negative. He underwent shockwave lithotripsy about 30 years ago for kidney stone.

## 2022-10-14 NOTE — PHYSICAL EXAM
[General Appearance - Well Developed] : well developed [General Appearance - Well Nourished] : well nourished [Normal Appearance] : normal appearance [Well Groomed] : well groomed [General Appearance - In No Acute Distress] : no acute distress [Abdomen Soft] : soft [Abdomen Tenderness] : non-tender [Costovertebral Angle Tenderness] : no ~M costovertebral angle tenderness [Urethral Meatus] : meatus normal [Penis Abnormality] : normal uncircumcised penis [Urinary Bladder Findings] : the bladder was normal on palpation [Scrotum] : the scrotum was normal [Testes Mass (___cm)] : there were no testicular masses [] : no respiratory distress [Respiration, Rhythm And Depth] : normal respiratory rhythm and effort [Exaggerated Use Of Accessory Muscles For Inspiration] : no accessory muscle use

## 2022-10-14 NOTE — ASSESSMENT
[FreeTextEntry1] : His medications were refilled.  His examination is unchanged.  Residual urine volume is acceptable.  He is not bothered by longstanding kidney stone.  He can follow-up in 1 year.\par \par Thang Gabriel MD, FACS\par [facility]\par  of Urology\par \par [address], [address]\[place] [address]\par \par [address], [address]\[place] [address]\par \par Tel: [phone]\par Fax: [phone]

## 2022-10-14 NOTE — LETTER BODY
[Dear  ___] : Dear  [unfilled], [Consult Letter:] : I had the pleasure of evaluating your patient, [unfilled]. [Consult Closing:] : Thank you very much for allowing me to participate in the care of this patient.  If you have any questions, please do not hesitate to contact me. [FreeTextEntry1] : Address:  [address], [place] [address]\par Phone: [phone]

## 2023-10-27 ENCOUNTER — APPOINTMENT (OUTPATIENT)
Dept: UROLOGY | Facility: CLINIC | Age: 88
End: 2023-10-27
Payer: MEDICARE

## 2023-10-27 DIAGNOSIS — N20.0 CALCULUS OF KIDNEY: ICD-10-CM

## 2023-10-27 DIAGNOSIS — R35.1 BENIGN PROSTATIC HYPERPLASIA WITH LOWER URINARY TRACT SYMPMS: ICD-10-CM

## 2023-10-27 DIAGNOSIS — N40.1 BENIGN PROSTATIC HYPERPLASIA WITH LOWER URINARY TRACT SYMPMS: ICD-10-CM

## 2023-10-27 PROCEDURE — 99213 OFFICE O/P EST LOW 20 MIN: CPT

## 2023-10-27 RX ORDER — FINASTERIDE 5 MG/1
5 TABLET, FILM COATED ORAL
Qty: 90 | Refills: 3 | Status: ACTIVE | COMMUNITY
Start: 2017-09-14 | End: 1900-01-01

## 2024-10-03 RX ORDER — TAMSULOSIN HYDROCHLORIDE 0.4 MG/1
0.4 CAPSULE ORAL
Qty: 30 | Refills: 0 | Status: ACTIVE | COMMUNITY
Start: 2024-10-03 | End: 1900-01-01

## 2024-10-23 ENCOUNTER — APPOINTMENT (OUTPATIENT)
Dept: UROLOGY | Facility: CLINIC | Age: 89
End: 2024-10-23
Payer: MEDICARE

## 2024-10-23 DIAGNOSIS — R39.9 UNSPECIFIED SYMPTOMS AND SIGNS INVOLVING THE GENITOURINARY SYSTEM: ICD-10-CM

## 2024-10-23 DIAGNOSIS — N40.1 BENIGN PROSTATIC HYPERPLASIA WITH LOWER URINARY TRACT SYMPMS: ICD-10-CM

## 2024-10-23 DIAGNOSIS — R35.1 BENIGN PROSTATIC HYPERPLASIA WITH LOWER URINARY TRACT SYMPMS: ICD-10-CM

## 2024-10-23 PROCEDURE — 99214 OFFICE O/P EST MOD 30 MIN: CPT

## 2024-10-29 ENCOUNTER — RX RENEWAL (OUTPATIENT)
Age: 89
End: 2024-10-29

## 2024-10-29 PROBLEM — R39.9 LOWER URINARY TRACT SYMPTOMS (LUTS): Status: ACTIVE | Noted: 2024-10-29

## 2024-11-06 ENCOUNTER — APPOINTMENT (OUTPATIENT)
Dept: UROLOGY | Facility: CLINIC | Age: 89
End: 2024-11-06
